# Patient Record
Sex: FEMALE | Race: WHITE | NOT HISPANIC OR LATINO | URBAN - METROPOLITAN AREA
[De-identification: names, ages, dates, MRNs, and addresses within clinical notes are randomized per-mention and may not be internally consistent; named-entity substitution may affect disease eponyms.]

---

## 2018-07-29 RX ORDER — NITROFURANTOIN MACROCRYSTAL 50 MG
1 CAPSULE ORAL
Qty: 0 | Refills: 0 | COMMUNITY
Start: 2018-07-29

## 2018-08-01 ENCOUNTER — INPATIENT (INPATIENT)
Facility: HOSPITAL | Age: 73
LOS: 1 days | Discharge: HOME | End: 2018-08-03
Attending: INTERNAL MEDICINE | Admitting: INTERNAL MEDICINE
Payer: MEDICARE

## 2018-08-01 VITALS — SYSTOLIC BLOOD PRESSURE: 123 MMHG | TEMPERATURE: 98 F | DIASTOLIC BLOOD PRESSURE: 81 MMHG | HEART RATE: 91 BPM

## 2018-08-01 DIAGNOSIS — D72.829 ELEVATED WHITE BLOOD CELL COUNT, UNSPECIFIED: ICD-10-CM

## 2018-08-01 DIAGNOSIS — R10.9 UNSPECIFIED ABDOMINAL PAIN: ICD-10-CM
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
## 2018-08-01 DIAGNOSIS — S00.12XA CONTUSION OF LEFT EYELID AND PERIOCULAR AREA, INITIAL ENCOUNTER: ICD-10-CM

## 2018-08-01 DIAGNOSIS — G30.9 ALZHEIMER'S DISEASE, UNSPECIFIED: ICD-10-CM

## 2018-08-01 DIAGNOSIS — R64 CACHEXIA: ICD-10-CM
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
## 2018-08-01 DIAGNOSIS — F02.81 DEMENTIA IN OTHER DISEASES CLASSIFIED ELSEWHERE, UNSPECIFIED SEVERITY, WITH BEHAVIORAL DISTURBANCE: ICD-10-CM

## 2018-08-01 DIAGNOSIS — F41.9 ANXIETY DISORDER, UNSPECIFIED: ICD-10-CM

## 2018-08-01 DIAGNOSIS — F03.91 UNSPECIFIED DEMENTIA WITH BEHAVIORAL DISTURBANCE: ICD-10-CM

## 2018-08-01 LAB
ALBUMIN SERPL ELPH-MCNC: 3 G/DL — LOW (ref 3.3–5)
ALP SERPL-CCNC: 98 U/L — SIGNIFICANT CHANGE UP (ref 40–120)
ALT FLD-CCNC: 19 U/L — SIGNIFICANT CHANGE UP (ref 12–78)
ANION GAP SERPL CALC-SCNC: 7 MMOL/L — SIGNIFICANT CHANGE UP (ref 5–17)
APPEARANCE UR: ABNORMAL
APTT BLD: 27 SEC — LOW (ref 27.5–37.4)
AST SERPL-CCNC: 32 U/L — SIGNIFICANT CHANGE UP (ref 15–37)
BACTERIA # UR AUTO: ABNORMAL
BASOPHILS # BLD AUTO: 0.05 K/UL — SIGNIFICANT CHANGE UP (ref 0–0.2)
BASOPHILS NFR BLD AUTO: 0.4 % — SIGNIFICANT CHANGE UP (ref 0–2)
BILIRUB SERPL-MCNC: 0.5 MG/DL — SIGNIFICANT CHANGE UP (ref 0.2–1.2)
BILIRUB UR-MCNC: NEGATIVE — SIGNIFICANT CHANGE UP
BUN SERPL-MCNC: 13 MG/DL — SIGNIFICANT CHANGE UP (ref 7–23)
CALCIUM SERPL-MCNC: 8.1 MG/DL — LOW (ref 8.5–10.1)
CHLORIDE SERPL-SCNC: 106 MMOL/L — SIGNIFICANT CHANGE UP (ref 96–108)
CO2 SERPL-SCNC: 28 MMOL/L — SIGNIFICANT CHANGE UP (ref 22–31)
COLOR SPEC: YELLOW — SIGNIFICANT CHANGE UP
CREAT SERPL-MCNC: 0.54 MG/DL — SIGNIFICANT CHANGE UP (ref 0.5–1.3)
DIFF PNL FLD: ABNORMAL
EOSINOPHIL # BLD AUTO: 0.08 K/UL — SIGNIFICANT CHANGE UP (ref 0–0.5)
EOSINOPHIL NFR BLD AUTO: 0.7 % — SIGNIFICANT CHANGE UP (ref 0–6)
EPI CELLS # UR: SIGNIFICANT CHANGE UP
GLUCOSE SERPL-MCNC: 86 MG/DL — SIGNIFICANT CHANGE UP (ref 70–99)
GLUCOSE UR QL: NEGATIVE MG/DL — SIGNIFICANT CHANGE UP
HCT VFR BLD CALC: 36.3 % — SIGNIFICANT CHANGE UP (ref 34.5–45)
HGB BLD-MCNC: 12.1 G/DL — SIGNIFICANT CHANGE UP (ref 11.5–15.5)
IMM GRANULOCYTES NFR BLD AUTO: 0.4 % — SIGNIFICANT CHANGE UP (ref 0–1.5)
INR BLD: 1.05 RATIO — SIGNIFICANT CHANGE UP (ref 0.88–1.16)
KETONES UR-MCNC: ABNORMAL
LACTATE SERPL-SCNC: 1.2 MMOL/L — SIGNIFICANT CHANGE UP (ref 0.7–2)
LEUKOCYTE ESTERASE UR-ACNC: ABNORMAL
LYMPHOCYTES # BLD AUTO: 0.89 K/UL — LOW (ref 1–3.3)
LYMPHOCYTES # BLD AUTO: 7.7 % — LOW (ref 13–44)
MCHC RBC-ENTMCNC: 32.4 PG — SIGNIFICANT CHANGE UP (ref 27–34)
MCHC RBC-ENTMCNC: 33.3 GM/DL — SIGNIFICANT CHANGE UP (ref 32–36)
MCV RBC AUTO: 97.3 FL — SIGNIFICANT CHANGE UP (ref 80–100)
MONOCYTES # BLD AUTO: 1.15 K/UL — HIGH (ref 0–0.9)
MONOCYTES NFR BLD AUTO: 10 % — SIGNIFICANT CHANGE UP (ref 2–14)
NEUTROPHILS # BLD AUTO: 9.31 K/UL — HIGH (ref 1.8–7.4)
NEUTROPHILS NFR BLD AUTO: 80.8 % — HIGH (ref 43–77)
NITRITE UR-MCNC: NEGATIVE — SIGNIFICANT CHANGE UP
NRBC # BLD: 0 /100 WBCS — SIGNIFICANT CHANGE UP (ref 0–0)
PH UR: 8 — SIGNIFICANT CHANGE UP (ref 5–8)
PLATELET # BLD AUTO: 179 K/UL — SIGNIFICANT CHANGE UP (ref 150–400)
POTASSIUM SERPL-MCNC: 4.2 MMOL/L — SIGNIFICANT CHANGE UP (ref 3.5–5.3)
POTASSIUM SERPL-SCNC: 4.2 MMOL/L — SIGNIFICANT CHANGE UP (ref 3.5–5.3)
PROT SERPL-MCNC: 6.6 GM/DL — SIGNIFICANT CHANGE UP (ref 6–8.3)
PROT UR-MCNC: 100 MG/DL
PROTHROM AB SERPL-ACNC: 11.3 SEC — SIGNIFICANT CHANGE UP (ref 9.8–12.7)
RBC # BLD: 3.73 M/UL — LOW (ref 3.8–5.2)
RBC # FLD: 12.7 % — SIGNIFICANT CHANGE UP (ref 10.3–14.5)
RBC CASTS # UR COMP ASSIST: >50 /HPF (ref 0–4)
SODIUM SERPL-SCNC: 141 MMOL/L — SIGNIFICANT CHANGE UP (ref 135–145)
SP GR SPEC: 1.01 — SIGNIFICANT CHANGE UP (ref 1.01–1.02)
UROBILINOGEN FLD QL: NEGATIVE MG/DL — SIGNIFICANT CHANGE UP
WBC # BLD: 11.53 K/UL — HIGH (ref 3.8–10.5)
WBC # FLD AUTO: 11.53 K/UL — HIGH (ref 3.8–10.5)
WBC UR QL: SIGNIFICANT CHANGE UP

## 2018-08-01 PROCEDURE — 73630 X-RAY EXAM OF FOOT: CPT | Mod: 26,RT

## 2018-08-01 PROCEDURE — 76377 3D RENDER W/INTRP POSTPROCES: CPT | Mod: 26

## 2018-08-01 PROCEDURE — 70450 CT HEAD/BRAIN W/O DYE: CPT | Mod: 26

## 2018-08-01 PROCEDURE — 99285 EMERGENCY DEPT VISIT HI MDM: CPT

## 2018-08-01 PROCEDURE — 71045 X-RAY EXAM CHEST 1 VIEW: CPT | Mod: 26

## 2018-08-01 RX ORDER — VANCOMYCIN HCL 1 G
1000 VIAL (EA) INTRAVENOUS ONCE
Qty: 0 | Refills: 0 | Status: COMPLETED | OUTPATIENT
Start: 2018-08-01 | End: 2018-08-01

## 2018-08-01 RX ORDER — HALOPERIDOL DECANOATE 100 MG/ML
3 INJECTION INTRAMUSCULAR ONCE
Qty: 0 | Refills: 0 | Status: COMPLETED | OUTPATIENT
Start: 2018-08-01 | End: 2018-08-01

## 2018-08-01 RX ORDER — SODIUM CHLORIDE 9 MG/ML
2000 INJECTION INTRAMUSCULAR; INTRAVENOUS; SUBCUTANEOUS ONCE
Qty: 0 | Refills: 0 | Status: COMPLETED | OUTPATIENT
Start: 2018-08-01 | End: 2018-08-01

## 2018-08-01 RX ORDER — DIPHENHYDRAMINE HCL 50 MG
25 CAPSULE ORAL ONCE
Qty: 0 | Refills: 0 | Status: COMPLETED | OUTPATIENT
Start: 2018-08-01 | End: 2018-08-01

## 2018-08-01 RX ORDER — CEFTRIAXONE 500 MG/1
1000 INJECTION, POWDER, FOR SOLUTION INTRAMUSCULAR; INTRAVENOUS ONCE
Qty: 0 | Refills: 0 | Status: COMPLETED | OUTPATIENT
Start: 2018-08-01 | End: 2018-08-01

## 2018-08-01 RX ORDER — ACETAMINOPHEN 500 MG
650 TABLET ORAL ONCE
Qty: 0 | Refills: 0 | Status: COMPLETED | OUTPATIENT
Start: 2018-08-01 | End: 2018-08-01

## 2018-08-01 RX ADMIN — CEFTRIAXONE 1000 MILLIGRAM(S): 500 INJECTION, POWDER, FOR SOLUTION INTRAMUSCULAR; INTRAVENOUS at 16:52

## 2018-08-01 RX ADMIN — Medication 25 MILLIGRAM(S): at 18:50

## 2018-08-01 RX ADMIN — SODIUM CHLORIDE 1333.33 MILLILITER(S): 9 INJECTION INTRAMUSCULAR; INTRAVENOUS; SUBCUTANEOUS at 16:41

## 2018-08-01 RX ADMIN — HALOPERIDOL DECANOATE 3 MILLIGRAM(S): 100 INJECTION INTRAMUSCULAR at 18:51

## 2018-08-01 RX ADMIN — Medication 0.5 MILLIGRAM(S): at 18:25

## 2018-08-01 RX ADMIN — Medication 650 MILLIGRAM(S): at 16:52

## 2018-08-01 RX ADMIN — Medication 1 MILLIGRAM(S): at 11:40

## 2018-08-01 RX ADMIN — Medication 250 MILLIGRAM(S): at 20:52

## 2018-08-01 RX ADMIN — Medication 1 MILLIGRAM(S): at 14:22

## 2018-08-01 RX ADMIN — HALOPERIDOL DECANOATE 3 MILLIGRAM(S): 100 INJECTION INTRAMUSCULAR at 11:41

## 2018-08-01 NOTE — ED PROVIDER NOTE - OBJECTIVE STATEMENT
72 y/o female with a PMHx of dementia, anxiety presents to the ED c/o abd pain, bruise on left eye. Hx given by daughters due to pt's mental status. Pt was agitated, swinging, trying to bite staff, and uncooperative with physical exam. Haldol and Ativan were given. Unable to obtain a complete Hx secondary to pt's dementia, medical sedation.

## 2018-08-01 NOTE — ED ADULT NURSE NOTE - NSIMPLEMENTINTERV_GEN_ALL_ED
Implemented All Fall with Harm Risk Interventions:  [place] to call system. Call bell, personal items and telephone within reach. Instruct patient to call for assistance. Room bathroom lighting operational. Non-slip footwear when patient is off stretcher. Physically safe environment: no spills, clutter or unnecessary equipment. Stretcher in lowest position, wheels locked, appropriate side rails in place. Provide visual cue, wrist band, yellow gown, etc. Monitor gait and stability. Monitor for mental status changes and reorient to person, place, and time. Review medications for side effects contributing to fall risk. Reinforce activity limits and safety measures with patient and family. Provide visual clues: red socks.

## 2018-08-01 NOTE — ED PROVIDER NOTE - MUSCULOSKELETAL, MLM
Mild dorsolateral ecchymosis, no swelling. No obvious gross deformity +bruising w/o associated tenderness. Right foot no gross deformity. DP pulse adequate. Trace non pitting edema. B/L legs no focal deformity TTP, swelling

## 2018-08-01 NOTE — ED ADULT TRIAGE NOTE - CHIEF COMPLAINT QUOTE
pt found with bruises under the eye, no witnessed fall. Pt is currently being treated for UTI, pt is very aggressive with staff unable to get vital signs at triage with history of dementia ( baseline)

## 2018-08-01 NOTE — ED PROVIDER NOTE - MEDICAL DECISION MAKING DETAILS
Elderly female +dementia recently on Macrobid for UTI. Hx of occasional agitation, violent outbursts. BIBA from nursing home after bruising left eye noted. c/o +right foot TTP, c/o of abd discomfort. Plan labs including urine, bladder skin, CT head/orbits, IV fluid. Observe reassess Elderly female +dementia recently on Macrobid for UTI. Hx of occasional agitation, violent outbursts. BIBA from nursing home after bruising left eye noted. c/o +right foot TTP, c/o of abd discomfort. Plan labs including urine, bladder scan, CT head/orbits, IV fluid. Observe reassess

## 2018-08-02 LAB
ANION GAP SERPL CALC-SCNC: 7 MMOL/L — SIGNIFICANT CHANGE UP (ref 5–17)
BASOPHILS # BLD AUTO: 0.04 K/UL — SIGNIFICANT CHANGE UP (ref 0–0.2)
BASOPHILS NFR BLD AUTO: 0.6 % — SIGNIFICANT CHANGE UP (ref 0–2)
BUN SERPL-MCNC: 9 MG/DL — SIGNIFICANT CHANGE UP (ref 7–23)
CALCIUM SERPL-MCNC: 8.1 MG/DL — LOW (ref 8.5–10.1)
CHLORIDE SERPL-SCNC: 112 MMOL/L — HIGH (ref 96–108)
CO2 SERPL-SCNC: 26 MMOL/L — SIGNIFICANT CHANGE UP (ref 22–31)
CREAT SERPL-MCNC: 0.47 MG/DL — LOW (ref 0.5–1.3)
CULTURE RESULTS: NO GROWTH — SIGNIFICANT CHANGE UP
EOSINOPHIL # BLD AUTO: 0.15 K/UL — SIGNIFICANT CHANGE UP (ref 0–0.5)
EOSINOPHIL NFR BLD AUTO: 2.3 % — SIGNIFICANT CHANGE UP (ref 0–6)
GLUCOSE SERPL-MCNC: 82 MG/DL — SIGNIFICANT CHANGE UP (ref 70–99)
HCT VFR BLD CALC: 33.6 % — LOW (ref 34.5–45)
HGB BLD-MCNC: 11.1 G/DL — LOW (ref 11.5–15.5)
IMM GRANULOCYTES NFR BLD AUTO: 0.3 % — SIGNIFICANT CHANGE UP (ref 0–1.5)
LYMPHOCYTES # BLD AUTO: 1.08 K/UL — SIGNIFICANT CHANGE UP (ref 1–3.3)
LYMPHOCYTES # BLD AUTO: 16.5 % — SIGNIFICANT CHANGE UP (ref 13–44)
MCHC RBC-ENTMCNC: 32.2 PG — SIGNIFICANT CHANGE UP (ref 27–34)
MCHC RBC-ENTMCNC: 33 GM/DL — SIGNIFICANT CHANGE UP (ref 32–36)
MCV RBC AUTO: 97.4 FL — SIGNIFICANT CHANGE UP (ref 80–100)
MONOCYTES # BLD AUTO: 0.91 K/UL — HIGH (ref 0–0.9)
MONOCYTES NFR BLD AUTO: 13.9 % — SIGNIFICANT CHANGE UP (ref 2–14)
NEUTROPHILS # BLD AUTO: 4.36 K/UL — SIGNIFICANT CHANGE UP (ref 1.8–7.4)
NEUTROPHILS NFR BLD AUTO: 66.4 % — SIGNIFICANT CHANGE UP (ref 43–77)
NRBC # BLD: 0 /100 WBCS — SIGNIFICANT CHANGE UP (ref 0–0)
PLATELET # BLD AUTO: 165 K/UL — SIGNIFICANT CHANGE UP (ref 150–400)
POTASSIUM SERPL-MCNC: 3.6 MMOL/L — SIGNIFICANT CHANGE UP (ref 3.5–5.3)
POTASSIUM SERPL-SCNC: 3.6 MMOL/L — SIGNIFICANT CHANGE UP (ref 3.5–5.3)
RBC # BLD: 3.45 M/UL — LOW (ref 3.8–5.2)
RBC # FLD: 12.9 % — SIGNIFICANT CHANGE UP (ref 10.3–14.5)
SODIUM SERPL-SCNC: 145 MMOL/L — SIGNIFICANT CHANGE UP (ref 135–145)
SPECIMEN SOURCE: SIGNIFICANT CHANGE UP
WBC # BLD: 6.56 K/UL — SIGNIFICANT CHANGE UP (ref 3.8–10.5)
WBC # FLD AUTO: 6.56 K/UL — SIGNIFICANT CHANGE UP (ref 3.8–10.5)

## 2018-08-02 RX ORDER — SENNA PLUS 8.6 MG/1
2 TABLET ORAL AT BEDTIME
Qty: 0 | Refills: 0 | Status: DISCONTINUED | OUTPATIENT
Start: 2018-08-02 | End: 2018-08-03

## 2018-08-02 RX ORDER — CEFTRIAXONE 500 MG/1
1000 INJECTION, POWDER, FOR SOLUTION INTRAMUSCULAR; INTRAVENOUS EVERY 24 HOURS
Qty: 0 | Refills: 0 | Status: DISCONTINUED | OUTPATIENT
Start: 2018-08-02 | End: 2018-08-03

## 2018-08-02 RX ORDER — HEPARIN SODIUM 5000 [USP'U]/ML
5000 INJECTION INTRAVENOUS; SUBCUTANEOUS EVERY 8 HOURS
Qty: 0 | Refills: 0 | Status: DISCONTINUED | OUTPATIENT
Start: 2018-08-02 | End: 2018-08-03

## 2018-08-02 RX ORDER — ALPRAZOLAM 0.25 MG
0.25 TABLET ORAL THREE TIMES A DAY
Qty: 0 | Refills: 0 | Status: DISCONTINUED | OUTPATIENT
Start: 2018-08-02 | End: 2018-08-03

## 2018-08-02 RX ORDER — DORZOLAMIDE HYDROCHLORIDE TIMOLOL MALEATE 20; 5 MG/ML; MG/ML
1 SOLUTION/ DROPS OPHTHALMIC
Qty: 0 | Refills: 0 | Status: DISCONTINUED | OUTPATIENT
Start: 2018-08-02 | End: 2018-08-03

## 2018-08-02 RX ORDER — QUETIAPINE FUMARATE 200 MG/1
25 TABLET, FILM COATED ORAL DAILY
Qty: 0 | Refills: 0 | Status: DISCONTINUED | OUTPATIENT
Start: 2018-08-02 | End: 2018-08-03

## 2018-08-02 RX ORDER — DOCUSATE SODIUM 100 MG
100 CAPSULE ORAL DAILY
Qty: 0 | Refills: 0 | Status: DISCONTINUED | OUTPATIENT
Start: 2018-08-02 | End: 2018-08-03

## 2018-08-02 RX ORDER — PIPERACILLIN AND TAZOBACTAM 4; .5 G/20ML; G/20ML
3.38 INJECTION, POWDER, LYOPHILIZED, FOR SOLUTION INTRAVENOUS EVERY 8 HOURS
Qty: 0 | Refills: 0 | Status: DISCONTINUED | OUTPATIENT
Start: 2018-08-02 | End: 2018-08-02

## 2018-08-02 RX ORDER — ACETAMINOPHEN 500 MG
650 TABLET ORAL EVERY 8 HOURS
Qty: 0 | Refills: 0 | Status: DISCONTINUED | OUTPATIENT
Start: 2018-08-02 | End: 2018-08-03

## 2018-08-02 RX ORDER — LANOLIN ALCOHOL/MO/W.PET/CERES
3 CREAM (GRAM) TOPICAL AT BEDTIME
Qty: 0 | Refills: 0 | Status: DISCONTINUED | OUTPATIENT
Start: 2018-08-02 | End: 2018-08-03

## 2018-08-02 RX ORDER — QUETIAPINE FUMARATE 200 MG/1
100 TABLET, FILM COATED ORAL AT BEDTIME
Qty: 0 | Refills: 0 | Status: DISCONTINUED | OUTPATIENT
Start: 2018-08-02 | End: 2018-08-03

## 2018-08-02 RX ORDER — DIVALPROEX SODIUM 500 MG/1
125 TABLET, DELAYED RELEASE ORAL
Qty: 0 | Refills: 0 | Status: DISCONTINUED | OUTPATIENT
Start: 2018-08-02 | End: 2018-08-03

## 2018-08-02 RX ADMIN — Medication 0.25 MILLIGRAM(S): at 16:54

## 2018-08-02 RX ADMIN — QUETIAPINE FUMARATE 25 MILLIGRAM(S): 200 TABLET, FILM COATED ORAL at 12:33

## 2018-08-02 RX ADMIN — DORZOLAMIDE HYDROCHLORIDE TIMOLOL MALEATE 1 DROP(S): 20; 5 SOLUTION/ DROPS OPHTHALMIC at 12:33

## 2018-08-02 RX ADMIN — DIVALPROEX SODIUM 125 MILLIGRAM(S): 500 TABLET, DELAYED RELEASE ORAL at 18:54

## 2018-08-02 RX ADMIN — HEPARIN SODIUM 5000 UNIT(S): 5000 INJECTION INTRAVENOUS; SUBCUTANEOUS at 06:58

## 2018-08-02 RX ADMIN — DORZOLAMIDE HYDROCHLORIDE TIMOLOL MALEATE 1 DROP(S): 20; 5 SOLUTION/ DROPS OPHTHALMIC at 18:56

## 2018-08-02 RX ADMIN — CEFTRIAXONE 1000 MILLIGRAM(S): 500 INJECTION, POWDER, FOR SOLUTION INTRAMUSCULAR; INTRAVENOUS at 16:54

## 2018-08-02 RX ADMIN — Medication 10 MILLIGRAM(S): at 12:35

## 2018-08-02 RX ADMIN — SENNA PLUS 2 TABLET(S): 8.6 TABLET ORAL at 21:23

## 2018-08-02 RX ADMIN — Medication 3 MILLIGRAM(S): at 21:23

## 2018-08-02 RX ADMIN — DIVALPROEX SODIUM 125 MILLIGRAM(S): 500 TABLET, DELAYED RELEASE ORAL at 12:33

## 2018-08-02 RX ADMIN — QUETIAPINE FUMARATE 100 MILLIGRAM(S): 200 TABLET, FILM COATED ORAL at 21:23

## 2018-08-02 RX ADMIN — HEPARIN SODIUM 5000 UNIT(S): 5000 INJECTION INTRAVENOUS; SUBCUTANEOUS at 21:32

## 2018-08-02 NOTE — PATIENT PROFILE ADULT. - TEACHING/LEARNING LEARNING PREFERENCES
computer/internet/skill demonstration/written material/audio/pictorial/video/group instruction/individual instruction/verbal instruction

## 2018-08-02 NOTE — H&P ADULT - HISTORY OF PRESENT ILLNESS
72 y/o female with a PMHx of dementia, anxiety presents to the ED c/o abd pain, bruise on left eye. Hx given by daughters due to pt's mental status. Pt was agitated, swinging, trying to bite staff, and uncooperative with physical exam. Haldol and Ativan were given. Unable to obtain a complete Hx secondary to pt's dementia, medical sedation.     Family Hx  -unable to verify the detail FH

## 2018-08-02 NOTE — H&P ADULT - ASSESSMENT
74 yo female presented with abdominal pain.    A/P:    1.  UTI  Pneumonia: HCAP  -started on IV antibiotics  -follow cultures  -follow ID consult  -follow clinically    2.  Dementia  Anxiety  -continue home medications    3.  Heparin for DVT ppx 74 yo female presented with abdominal pain.    A/P:    1.  UTI  Pneumonia: HCAP  -started on IV antibiotics  -follow cultures  -follow ID consult  -follow clinically    2.  Dementia  Anxiety  -continue home medications  -keep on 1:1 constant observation    3.  Heparin for DVT ppx

## 2018-08-02 NOTE — ED ADULT NURSE REASSESSMENT NOTE - NS ED NURSE REASSESS COMMENT FT1
Attempt to straight cath patient for urine sample. Urine amount too small to send to lab. Patient's pullup was saturated. Will attempt later.
[facility] [phone]
Pt remains as previously assessed, MD Boyd at bedside and aware of patient's status, pt remains asleep and drowsy but responsive to physical touch, 1:1 observation maintained, comfort and safety measures maintained, VS stable, pt transferred off of ER to inpatient unit. Report given to RN there.
Received patient from SHIRA Tony. Pt asleep, drowsy but arousable to touch, pt compliant with nursing care, cleaned, changed and repositioned, VS stable, comfort and safety measures maintained, Pt in front of nurses station, will continue to monitor.

## 2018-08-02 NOTE — H&P ADULT - NSHPPHYSICALEXAM_GEN_ALL_CORE
Vital Signs Last 24 Hrs  T(C): 37.6 (02 Aug 2018 02:22), Max: 38.7 (01 Aug 2018 15:50)  T(F): 99.7 (02 Aug 2018 02:22), Max: 101.6 (01 Aug 2018 15:50)  HR: 89 (01 Aug 2018 20:53) (89 - 91)  BP: 122/53 (01 Aug 2018 20:53) (122/53 - 123/81)  RR: 19 (01 Aug 2018 20:53) (19 - 19)  SpO2: 98% (01 Aug 2018 20:53) (98% - 98%)

## 2018-08-03 ENCOUNTER — TRANSCRIPTION ENCOUNTER (OUTPATIENT)
Age: 73
End: 2018-08-03

## 2018-08-03 VITALS
SYSTOLIC BLOOD PRESSURE: 119 MMHG | TEMPERATURE: 98 F | RESPIRATION RATE: 18 BRPM | DIASTOLIC BLOOD PRESSURE: 51 MMHG | OXYGEN SATURATION: 99 % | HEART RATE: 75 BPM

## 2018-08-03 PROCEDURE — 73562 X-RAY EXAM OF KNEE 3: CPT | Mod: 26,RT

## 2018-08-03 PROCEDURE — 99223 1ST HOSP IP/OBS HIGH 75: CPT

## 2018-08-03 RX ADMIN — HEPARIN SODIUM 5000 UNIT(S): 5000 INJECTION INTRAVENOUS; SUBCUTANEOUS at 14:55

## 2018-08-03 RX ADMIN — Medication 0.25 MILLIGRAM(S): at 14:55

## 2018-08-03 RX ADMIN — DIVALPROEX SODIUM 125 MILLIGRAM(S): 500 TABLET, DELAYED RELEASE ORAL at 05:53

## 2018-08-03 RX ADMIN — Medication 650 MILLIGRAM(S): at 10:40

## 2018-08-03 RX ADMIN — Medication 0.25 MILLIGRAM(S): at 05:53

## 2018-08-03 RX ADMIN — Medication 100 MILLIGRAM(S): at 11:56

## 2018-08-03 RX ADMIN — QUETIAPINE FUMARATE 25 MILLIGRAM(S): 200 TABLET, FILM COATED ORAL at 11:57

## 2018-08-03 RX ADMIN — Medication 10 MILLIGRAM(S): at 11:56

## 2018-08-03 RX ADMIN — DORZOLAMIDE HYDROCHLORIDE TIMOLOL MALEATE 1 DROP(S): 20; 5 SOLUTION/ DROPS OPHTHALMIC at 05:54

## 2018-08-03 RX ADMIN — HEPARIN SODIUM 5000 UNIT(S): 5000 INJECTION INTRAVENOUS; SUBCUTANEOUS at 05:53

## 2018-08-03 NOTE — DISCHARGE NOTE ADULT - HOSPITAL COURSE
74 y/o female with a PMHx of dementia, anxiety presents to the ED c/o abd pain, bruise on left eye. Hx given by daughters due to pt's mental status. Pt was agitated, swinging, trying to bite staff, and uncooperative with physical exam. Haldol and Ativan were given. Unable to obtain a complete Hx secondary to pt's dementia, medical sedation.     Hospital course: Patient was admitted to medical floor. She had CT head on admission: no acute changes. She was started on IV rocephin for suspected UTI. Seen by Dr Henao. Seen by Dr Ness.   Blood cultres and urine cultures are negative. IV rocephin was discontinued. She was put on CO for safety. Discussed with  and daughter yesterday and today regarding management and d/c plan. Patient is followed by Dr Wiggins and Dr Frank. Medically stable for discharge.     Vital Signs Last 24 Hrs  T(C): 36.8 (03 Aug 2018 11:26), Max: 37.3 (03 Aug 2018 06:01)  T(F): 98.2 (03 Aug 2018 11:26), Max: 99.2 (03 Aug 2018 06:01)  HR: 75 (03 Aug 2018 11:26) (68 - 93)  BP: 119/51 (03 Aug 2018 11:26) (119/50 - 127/72)  BP(mean): --  RR: 18 (03 Aug 2018 11:26) (18 - 18)  SpO2: 99% (03 Aug 2018 11:26) (98% - 99%)        Physical exam:     HEENT: AT, normocephalic,   Neck: supple, no JVD  Heart: S1, S2 regular.  Lungs: clear, no rales.  Abdomen: soft, nontender, BS present  Neuro: difficult to assess due to advance dementia      Assessment and plan:    1. Worsening dementia-  CT head: no acute changes.  Blood cultures and urine cultures: no growth  No UTI, no pneumonia  Dr Ness consult appreciated.  On CO for safety.  Outpatient psych and neuro follow up and adjust meds.  Continue outpatient meds.      Back to assisted living.  Spent more than 30 minutes to prepare the discharge.  PMD was notified regarding d/c 74 y/o female with a PMHx of dementia, anxiety presents to the ED c/o abd pain, bruise on left eye. Hx given by daughters due to pt's mental status. Pt was agitated, swinging, trying to bite staff, and uncooperative with physical exam. Haldol and Ativan were given. Unable to obtain a complete Hx secondary to pt's dementia, medical sedation.     Hospital course: Patient was admitted to medical floor. She had CT head on admission: no acute changes. She was started on IV rocephin for suspected UTI. Seen by Dr Henao. Seen by Dr Ness.   Blood cultres and urine cultures are negative. IV rocephin was discontinued. She was put on CO for safety. Discussed with  and daughter yesterday and today regarding management and d/c plan. Patient is followed by Dr Wiggins and Dr Frank. Medically stable for discharge.     Vital Signs Last 24 Hrs  T(C): 36.8 (03 Aug 2018 11:26), Max: 37.3 (03 Aug 2018 06:01)  T(F): 98.2 (03 Aug 2018 11:26), Max: 99.2 (03 Aug 2018 06:01)  HR: 75 (03 Aug 2018 11:26) (68 - 93)  BP: 119/51 (03 Aug 2018 11:26) (119/50 - 127/72)  BP(mean): --  RR: 18 (03 Aug 2018 11:26) (18 - 18)  SpO2: 99% (03 Aug 2018 11:26) (98% - 99%)        Physical exam:     HEENT: AT, normocephalic,   Neck: supple, no JVD  Heart: S1, S2 regular.  Lungs: clear, no rales.  Abdomen: soft, nontender, BS present  Neuro: difficult to assess due to advance dementia      Assessment and plan:    1. Worsening Alzheimer's dementia-  CT head: no acute changes.  Blood cultures and urine cultures: no growth  No UTI, no pneumonia  Dr Ness consult appreciated.  On CO for safety.  Outpatient psych and neuro follow up and adjust meds.  Continue outpatient meds.      Back to assisted living.  Spent more than 30 minutes to prepare the discharge.  PMD was notified regarding d/c

## 2018-08-03 NOTE — DISCHARGE NOTE ADULT - CARE PLAN
Principal Discharge DX:	Anxiety  Goal:	prevent further admission  Assessment and plan of treatment:	Follow up with Dr Wiggins

## 2018-08-03 NOTE — DISCHARGE NOTE ADULT - PATIENT PORTAL LINK FT
You can access the [facility], offered by [facility], by registering with the following website: [email or web address]

## 2018-08-03 NOTE — DISCHARGE NOTE ADULT - CARE PROVIDERS DIRECT ADDRESSES
,[email or web address] sale to [facility],DirectAddress_Unknown

## 2018-08-03 NOTE — CONSULT NOTE ADULT - ASSESSMENT
74 y/o female with a PMHx of dementia, anxiety presents to the ED c/o abd pain, bruise on left eye. As per ED physicians note; pt was agitated, swinging, trying to bite staff, and uncooperative with physical exam.    As per pts husbnad and son, pt has had evolving cognitive and behavioral changes since past 2 years, she needs assistance, can barely take care of her ADL's, she has been seeing a psychiatrist, is on Seroquel, Depakote and Paroxetine.     Neuro exam is limited, pt is tearful, looks sad, barely talks.     # Dementia with behavioral disorder.    - I had a detailed d/w pt's  and son, I have told them that she needs to be evaluated as OP for cognitive   decline; in the current setting assessing mnetal status is difficult.  - Pt seems depressed; she needs joycelyn-psyche input to adjust mood stabilizers.  - Obtain B12, FOL, TSH    # Leukocytosis/probable UTI    - Management as per ID    d/w PT's , her son and with Dr. Crawford    Time spent: 60 minutes
74 y/o female with a PMHx of dementia, anxiety presents to the ED c/o abd pain, bruise on left eye. Hx given by daughters due to pt's mental status. Pt was agitated, swinging, trying to bite staff, and uncooperative with physical exam. Haldol and Ativan were given. Unable to obtain a complete Hx secondary to pt's dementia, medical sedation. Here, noted with 11.5, UA positive, xray no obvious infiltrate or PNA was given IV zosyn d/t concern for infection.     1. leukocytosis/pyuria/probable UTI/ams/alzheimers dementia  - dc zosyn to rocephin [id number]   - f/u urine cx/blood cx  - xray reviewed  - monitor temps  - tolerating abx well so far; no side effects noted  - reason for abx use and side effects reviewed with patient  - supportive care  - fu cbc    2. other issues - care per medicine

## 2018-08-03 NOTE — DISCHARGE NOTE ADULT - MEDICATION SUMMARY - MEDICATIONS TO TAKE
I will START or STAY ON the medications listed below when I get home from the hospital:    Depakote Sprinkles 125 mg oral delayed release capsule  -- 1 cap(s) by mouth 2 times a day  -- Indication: For dementia/depression    PARoxetine 10 mg oral tablet  -- 1 tab(s) by mouth once a day  -- Indication: For depression    QUEtiapine 25 mg oral tablet  -- 1 tab(s) by mouth once a day  -- Indication: For depression    QUEtiapine 100 mg oral tablet  -- 1 tab(s) by mouth once a day (at bedtime)  -- Indication: For depression    Xanax 0.25 mg oral tablet  -- 1 tab(s) by mouth 3 times a day  -- Indication: For anxiety    Colace 100 mg oral capsule  -- 1 cap(s) by mouth once a day  -- Indication: For constipation    senna 8.6 mg oral tablet  -- 2 tab(s) by mouth once a day (at bedtime)  -- Indication: For constipation    melatonin 3 mg oral tablet  -- 1 tab(s) by mouth once (at bedtime)  -- Indication: For sleep    Cosopt PF 2%-0.5% ophthalmic solution  -- 1 drop(s) in the left eye 2 times a day  -- Indication: For eye drop

## 2018-08-03 NOTE — DISCHARGE NOTE ADULT - CARE PROVIDER_API CALL
Lottie Frank), Neurology  [address] [place] [address]  Phone: [phone]  Fax: [phone]    Lazaro Emerson), Medicine  [address] [place] [address]  Phone: [phone]  Fax: [phone]

## 2018-08-06 ENCOUNTER — EMERGENCY (EMERGENCY)
Facility: HOSPITAL | Age: 73
LOS: 0 days | Discharge: HOME | End: 2018-08-06
Attending: EMERGENCY MEDICINE | Admitting: EMERGENCY MEDICINE
Payer: MEDICARE

## 2018-08-06 VITALS
DIASTOLIC BLOOD PRESSURE: 74 MMHG | HEART RATE: 63 BPM | SYSTOLIC BLOOD PRESSURE: 147 MMHG | TEMPERATURE: 98 F | RESPIRATION RATE: 18 BRPM | WEIGHT: 100.09 LBS | OXYGEN SATURATION: 100 %

## 2018-08-06 DIAGNOSIS — Z90.89 ACQUIRED ABSENCE OF OTHER ORGANS: ICD-10-CM
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
## 2018-08-06 DIAGNOSIS — F03.90 UNSPECIFIED DEMENTIA WITHOUT BEHAVIORAL DISTURBANCE: ICD-10-CM

## 2018-08-06 DIAGNOSIS — S09.90XA UNSPECIFIED INJURY OF HEAD, INITIAL ENCOUNTER: ICD-10-CM

## 2018-08-06 DIAGNOSIS — M25.559 PAIN IN UNSPECIFIED HIP: ICD-10-CM
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
## 2018-08-06 DIAGNOSIS — S00.91XA ABRASION OF UNSPECIFIED PART OF HEAD, INITIAL ENCOUNTER: ICD-10-CM

## 2018-08-06 DIAGNOSIS — M50.30 OTHER CERVICAL DISC DEGENERATION, UNSPECIFIED CERVICAL REGION: ICD-10-CM

## 2018-08-06 DIAGNOSIS — Z87.19 PERSONAL HISTORY OF OTHER DISEASES OF THE DIGESTIVE SYSTEM: ICD-10-CM

## 2018-08-06 DIAGNOSIS — F41.9 ANXIETY DISORDER, UNSPECIFIED: ICD-10-CM

## 2018-08-06 LAB
CULTURE RESULTS: SIGNIFICANT CHANGE UP
SPECIMEN SOURCE: SIGNIFICANT CHANGE UP

## 2018-08-06 PROCEDURE — 72192 CT PELVIS W/O DYE: CPT | Mod: 26

## 2018-08-06 PROCEDURE — 70450 CT HEAD/BRAIN W/O DYE: CPT | Mod: 26

## 2018-08-06 PROCEDURE — 72125 CT NECK SPINE W/O DYE: CPT | Mod: 26

## 2018-08-06 PROCEDURE — 99285 EMERGENCY DEPT VISIT HI MDM: CPT

## 2018-08-06 PROCEDURE — 76377 3D RENDER W/INTRP POSTPROCES: CPT | Mod: 26

## 2018-08-06 PROCEDURE — 93010 ELECTROCARDIOGRAM REPORT: CPT

## 2018-08-06 NOTE — ED PROVIDER NOTE - OBJECTIVE STATEMENT
74 y/o female with a PMHx of anxiety presents to the ED s/p fall. Pt was home, aid walked in and found pt on floor. Aid notes abrasion on pt's head. +head injury. Pt is poor historian. PCP: Dr. Booth. 74 y/o female with a PMHx of anxiety, dementia presents to the ED s/p fall. Pt was home, aid walked in and found pt on floor. Aid notes abrasion on pt's head. +head injury. Pt is poor historian. PCP: Dr. Booth.

## 2018-08-06 NOTE — ED ADULT NURSE NOTE - OBJECTIVE STATEMENT
Pt BIBA from [facility] dementia unit s/p fall from bed on to carpet floor. Per HHA, pt was found on floor next to bed when she arrived this morning. Pt has minor laceration to right forehead, no active bleeding. Per  at bedside pt is acting at baseline. Pt has hx of dementia. Pt appears in no acute distress.

## 2018-08-06 NOTE — ED ADULT NURSE REASSESSMENT NOTE - NS ED NURSE REASSESS COMMENT FT1
Pt has minor laceration to right forehead, no bleeding. Pt's lac cleaned, Bacitracin and band aid applied. Pt's daughter,  and aide at bedside.

## 2018-08-06 NOTE — ED ADULT TRIAGE NOTE - CHIEF COMPLAINT QUOTE
Unwitnessed fall at NH, pt without complaints on arrival.  0.5-cm lac to R temple, no bleeding.  No anticoagulants.  Baseline dementia.

## 2018-08-06 NOTE — ED PROVIDER NOTE - SKIN, MLM
Skin normal color for race, warm, dry and intact. No evidence of rash. Hematoma abrasion right forehead area

## 2018-08-06 NOTE — ED ADULT NURSE NOTE - NSIMPLEMENTINTERV_GEN_ALL_ED
Implemented All Fall with Harm Risk Interventions:  [place] to call system. Call bell, personal items and telephone within reach. Instruct patient to call for assistance. Room bathroom lighting operational. Non-slip footwear when patient is off stretcher. Physically safe environment: no spills, clutter or unnecessary equipment. Stretcher in lowest position, wheels locked, appropriate side rails in place. Provide visual cue, wrist band, yellow gown, etc. Monitor gait and stability. Monitor for mental status changes and reorient to person, place, and time. Review medications for side effects contributing to fall risk. Reinforce activity limits and safety measures with patient and family. Provide visual clues: red socks.

## 2018-08-31 RX ORDER — TOBRAMYCIN 0.3 %
1 DROPS OPHTHALMIC (EYE)
Qty: 0 | Refills: 0 | COMMUNITY
Start: 2018-08-31 | End: 2018-09-06

## 2018-08-31 RX ORDER — CEFUROXIME AXETIL 250 MG
1 TABLET ORAL
Qty: 0 | Refills: 0 | COMMUNITY
Start: 2018-08-31 | End: 2018-09-10

## 2018-09-18 RX ORDER — GUAIFENESIN/DEXTROMETHORPHAN 600MG-30MG
5 TABLET, EXTENDED RELEASE 12 HR ORAL
Qty: 0 | Refills: 0 | COMMUNITY
Start: 2018-09-18 | End: 2018-09-24

## 2018-09-20 RX ORDER — ACETAMINOPHEN 500 MG
2 TABLET ORAL
Qty: 0 | Refills: 0 | COMMUNITY
Start: 2018-09-20 | End: 2018-09-22

## 2018-09-21 ENCOUNTER — INPATIENT (INPATIENT)
Facility: HOSPITAL | Age: 73
LOS: 3 days | Discharge: HOSPICE | End: 2018-09-25
Attending: INTERNAL MEDICINE | Admitting: INTERNAL MEDICINE
Payer: MEDICARE

## 2018-09-21 VITALS
SYSTOLIC BLOOD PRESSURE: 122 MMHG | TEMPERATURE: 103 F | HEART RATE: 90 BPM | WEIGHT: 130.07 LBS | OXYGEN SATURATION: 95 % | DIASTOLIC BLOOD PRESSURE: 95 MMHG | HEIGHT: 65 IN | RESPIRATION RATE: 18 BRPM

## 2018-09-21 DIAGNOSIS — A41.9 SEPSIS, UNSPECIFIED ORGANISM: ICD-10-CM

## 2018-09-21 DIAGNOSIS — B34.1 ENTEROVIRUS INFECTION, UNSPECIFIED: ICD-10-CM

## 2018-09-21 DIAGNOSIS — G20 PARKINSON'S DISEASE: ICD-10-CM

## 2018-09-21 DIAGNOSIS — F03.90 UNSPECIFIED DEMENTIA WITHOUT BEHAVIORAL DISTURBANCE: ICD-10-CM

## 2018-09-21 DIAGNOSIS — J15.9 UNSPECIFIED BACTERIAL PNEUMONIA: ICD-10-CM

## 2018-09-21 DIAGNOSIS — F41.9 ANXIETY DISORDER, UNSPECIFIED: ICD-10-CM

## 2018-09-21 LAB
ALBUMIN SERPL ELPH-MCNC: 2.4 G/DL — LOW (ref 3.3–5)
ALP SERPL-CCNC: 69 U/L — SIGNIFICANT CHANGE UP (ref 40–120)
ALT FLD-CCNC: 15 U/L — SIGNIFICANT CHANGE UP (ref 12–78)
ANION GAP SERPL CALC-SCNC: 8 MMOL/L — SIGNIFICANT CHANGE UP (ref 5–17)
APPEARANCE UR: CLEAR — SIGNIFICANT CHANGE UP
APTT BLD: 27.4 SEC — LOW (ref 27.5–37.4)
AST SERPL-CCNC: 26 U/L — SIGNIFICANT CHANGE UP (ref 15–37)
BASOPHILS # BLD AUTO: 0.04 K/UL — SIGNIFICANT CHANGE UP (ref 0–0.2)
BASOPHILS NFR BLD AUTO: 0.4 % — SIGNIFICANT CHANGE UP (ref 0–2)
BILIRUB SERPL-MCNC: 0.6 MG/DL — SIGNIFICANT CHANGE UP (ref 0.2–1.2)
BILIRUB UR-MCNC: NEGATIVE — SIGNIFICANT CHANGE UP
BUN SERPL-MCNC: 12 MG/DL — SIGNIFICANT CHANGE UP (ref 7–23)
CALCIUM SERPL-MCNC: 8.4 MG/DL — LOW (ref 8.5–10.1)
CHLORIDE SERPL-SCNC: 104 MMOL/L — SIGNIFICANT CHANGE UP (ref 96–108)
CO2 SERPL-SCNC: 29 MMOL/L — SIGNIFICANT CHANGE UP (ref 22–31)
COLOR SPEC: YELLOW — SIGNIFICANT CHANGE UP
CREAT SERPL-MCNC: 0.53 MG/DL — SIGNIFICANT CHANGE UP (ref 0.5–1.3)
DIFF PNL FLD: ABNORMAL
EOSINOPHIL # BLD AUTO: 0.01 K/UL — SIGNIFICANT CHANGE UP (ref 0–0.5)
EOSINOPHIL NFR BLD AUTO: 0.1 % — SIGNIFICANT CHANGE UP (ref 0–6)
GLUCOSE SERPL-MCNC: 88 MG/DL — SIGNIFICANT CHANGE UP (ref 70–99)
GLUCOSE UR QL: NEGATIVE MG/DL — SIGNIFICANT CHANGE UP
HCT VFR BLD CALC: 34.7 % — SIGNIFICANT CHANGE UP (ref 34.5–45)
HGB BLD-MCNC: 11.3 G/DL — LOW (ref 11.5–15.5)
IMM GRANULOCYTES NFR BLD AUTO: 0.6 % — SIGNIFICANT CHANGE UP (ref 0–1.5)
INR BLD: 1.25 RATIO — HIGH (ref 0.88–1.16)
KETONES UR-MCNC: ABNORMAL
LACTATE SERPL-SCNC: 1.4 MMOL/L — SIGNIFICANT CHANGE UP (ref 0.7–2)
LEUKOCYTE ESTERASE UR-ACNC: ABNORMAL
LYMPHOCYTES # BLD AUTO: 0.37 K/UL — LOW (ref 1–3.3)
LYMPHOCYTES # BLD AUTO: 3.5 % — LOW (ref 13–44)
MCHC RBC-ENTMCNC: 32.2 PG — SIGNIFICANT CHANGE UP (ref 27–34)
MCHC RBC-ENTMCNC: 32.6 GM/DL — SIGNIFICANT CHANGE UP (ref 32–36)
MCV RBC AUTO: 98.9 FL — SIGNIFICANT CHANGE UP (ref 80–100)
MONOCYTES # BLD AUTO: 0.82 K/UL — SIGNIFICANT CHANGE UP (ref 0–0.9)
MONOCYTES NFR BLD AUTO: 7.8 % — SIGNIFICANT CHANGE UP (ref 2–14)
NEUTROPHILS # BLD AUTO: 9.19 K/UL — HIGH (ref 1.8–7.4)
NEUTROPHILS NFR BLD AUTO: 87.6 % — HIGH (ref 43–77)
NITRITE UR-MCNC: NEGATIVE — SIGNIFICANT CHANGE UP
NRBC # BLD: 0 /100 WBCS — SIGNIFICANT CHANGE UP (ref 0–0)
PH UR: 8 — SIGNIFICANT CHANGE UP (ref 5–8)
PLATELET # BLD AUTO: 201 K/UL — SIGNIFICANT CHANGE UP (ref 150–400)
POTASSIUM SERPL-MCNC: 4 MMOL/L — SIGNIFICANT CHANGE UP (ref 3.5–5.3)
POTASSIUM SERPL-SCNC: 4 MMOL/L — SIGNIFICANT CHANGE UP (ref 3.5–5.3)
PROT SERPL-MCNC: 7.2 GM/DL — SIGNIFICANT CHANGE UP (ref 6–8.3)
PROT UR-MCNC: 15 MG/DL
PROTHROM AB SERPL-ACNC: 13.6 SEC — HIGH (ref 9.8–12.7)
RAPID RVP RESULT: DETECTED
RBC # BLD: 3.51 M/UL — LOW (ref 3.8–5.2)
RBC # FLD: 12.3 % — SIGNIFICANT CHANGE UP (ref 10.3–14.5)
RBC CASTS # UR COMP ASSIST: SIGNIFICANT CHANGE UP /HPF (ref 0–4)
RV+EV RNA SPEC QL NAA+PROBE: DETECTED
SODIUM SERPL-SCNC: 141 MMOL/L — SIGNIFICANT CHANGE UP (ref 135–145)
SP GR SPEC: 1.01 — SIGNIFICANT CHANGE UP (ref 1.01–1.02)
UROBILINOGEN FLD QL: 1 MG/DL
WBC # BLD: 10.49 K/UL — SIGNIFICANT CHANGE UP (ref 3.8–10.5)
WBC # FLD AUTO: 10.49 K/UL — SIGNIFICANT CHANGE UP (ref 3.8–10.5)
WBC UR QL: SIGNIFICANT CHANGE UP

## 2018-09-21 PROCEDURE — 93010 ELECTROCARDIOGRAM REPORT: CPT

## 2018-09-21 PROCEDURE — 74177 CT ABD & PELVIS W/CONTRAST: CPT | Mod: 26

## 2018-09-21 PROCEDURE — 90792 PSYCH DIAG EVAL W/MED SRVCS: CPT

## 2018-09-21 PROCEDURE — 99291 CRITICAL CARE FIRST HOUR: CPT

## 2018-09-21 PROCEDURE — 71045 X-RAY EXAM CHEST 1 VIEW: CPT | Mod: 26

## 2018-09-21 RX ORDER — SODIUM CHLORIDE 9 MG/ML
1750 INJECTION INTRAMUSCULAR; INTRAVENOUS; SUBCUTANEOUS ONCE
Qty: 0 | Refills: 0 | Status: COMPLETED | OUTPATIENT
Start: 2018-09-21 | End: 2018-09-21

## 2018-09-21 RX ORDER — DOCUSATE SODIUM 100 MG
100 CAPSULE ORAL DAILY
Qty: 0 | Refills: 0 | Status: DISCONTINUED | OUTPATIENT
Start: 2018-09-21 | End: 2018-09-21

## 2018-09-21 RX ORDER — SENNA PLUS 8.6 MG/1
2 TABLET ORAL
Qty: 0 | Refills: 0 | COMMUNITY

## 2018-09-21 RX ORDER — VANCOMYCIN HCL 1 G
1000 VIAL (EA) INTRAVENOUS EVERY 12 HOURS
Qty: 0 | Refills: 0 | Status: DISCONTINUED | OUTPATIENT
Start: 2018-09-21 | End: 2018-09-22

## 2018-09-21 RX ORDER — QUETIAPINE FUMARATE 200 MG/1
1 TABLET, FILM COATED ORAL
Qty: 0 | Refills: 0 | COMMUNITY

## 2018-09-21 RX ORDER — DIVALPROEX SODIUM 500 MG/1
1 TABLET, DELAYED RELEASE ORAL
Qty: 0 | Refills: 0 | COMMUNITY

## 2018-09-21 RX ORDER — LANOLIN ALCOHOL/MO/W.PET/CERES
3 CREAM (GRAM) TOPICAL AT BEDTIME
Qty: 0 | Refills: 0 | Status: DISCONTINUED | OUTPATIENT
Start: 2018-09-21 | End: 2018-09-25

## 2018-09-21 RX ORDER — DIVALPROEX SODIUM 500 MG/1
2 TABLET, DELAYED RELEASE ORAL
Qty: 0 | Refills: 0 | COMMUNITY

## 2018-09-21 RX ORDER — ALPRAZOLAM 0.25 MG
0.25 TABLET ORAL THREE TIMES A DAY
Qty: 0 | Refills: 0 | Status: DISCONTINUED | OUTPATIENT
Start: 2018-09-21 | End: 2018-09-25

## 2018-09-21 RX ORDER — ACETAMINOPHEN 500 MG
975 TABLET ORAL ONCE
Qty: 0 | Refills: 0 | Status: COMPLETED | OUTPATIENT
Start: 2018-09-21 | End: 2018-09-21

## 2018-09-21 RX ORDER — PIPERACILLIN AND TAZOBACTAM 4; .5 G/20ML; G/20ML
3.38 INJECTION, POWDER, LYOPHILIZED, FOR SOLUTION INTRAVENOUS ONCE
Qty: 0 | Refills: 0 | Status: COMPLETED | OUTPATIENT
Start: 2018-09-21 | End: 2018-09-21

## 2018-09-21 RX ORDER — DORZOLAMIDE HYDROCHLORIDE TIMOLOL MALEATE 20; 5 MG/ML; MG/ML
1 SOLUTION/ DROPS OPHTHALMIC
Qty: 0 | Refills: 0 | Status: DISCONTINUED | OUTPATIENT
Start: 2018-09-21 | End: 2018-09-25

## 2018-09-21 RX ORDER — QUETIAPINE FUMARATE 200 MG/1
100 TABLET, FILM COATED ORAL AT BEDTIME
Qty: 0 | Refills: 0 | Status: DISCONTINUED | OUTPATIENT
Start: 2018-09-21 | End: 2018-09-25

## 2018-09-21 RX ORDER — ALPRAZOLAM 0.25 MG
1 TABLET ORAL
Qty: 0 | Refills: 0 | COMMUNITY

## 2018-09-21 RX ORDER — DORZOLAMIDE HYDROCHLORIDE TIMOLOL MALEATE 20; 5 MG/ML; MG/ML
1 SOLUTION/ DROPS OPHTHALMIC
Qty: 0 | Refills: 0 | COMMUNITY

## 2018-09-21 RX ORDER — LANOLIN ALCOHOL/MO/W.PET/CERES
1 CREAM (GRAM) TOPICAL
Qty: 0 | Refills: 0 | COMMUNITY

## 2018-09-21 RX ORDER — QUETIAPINE FUMARATE 200 MG/1
25 TABLET, FILM COATED ORAL DAILY
Qty: 0 | Refills: 0 | Status: DISCONTINUED | OUTPATIENT
Start: 2018-09-21 | End: 2018-09-25

## 2018-09-21 RX ORDER — PIPERACILLIN AND TAZOBACTAM 4; .5 G/20ML; G/20ML
3.38 INJECTION, POWDER, LYOPHILIZED, FOR SOLUTION INTRAVENOUS EVERY 8 HOURS
Qty: 0 | Refills: 0 | Status: DISCONTINUED | OUTPATIENT
Start: 2018-09-21 | End: 2018-09-25

## 2018-09-21 RX ORDER — VANCOMYCIN HCL 1 G
1000 VIAL (EA) INTRAVENOUS ONCE
Qty: 0 | Refills: 0 | Status: COMPLETED | OUTPATIENT
Start: 2018-09-21 | End: 2018-09-21

## 2018-09-21 RX ORDER — SENNA PLUS 8.6 MG/1
2 TABLET ORAL AT BEDTIME
Qty: 0 | Refills: 0 | Status: DISCONTINUED | OUTPATIENT
Start: 2018-09-21 | End: 2018-09-21

## 2018-09-21 RX ORDER — DIVALPROEX SODIUM 500 MG/1
125 TABLET, DELAYED RELEASE ORAL
Qty: 0 | Refills: 0 | Status: DISCONTINUED | OUTPATIENT
Start: 2018-09-21 | End: 2018-09-25

## 2018-09-21 RX ORDER — DOCUSATE SODIUM 100 MG
1 CAPSULE ORAL
Qty: 0 | Refills: 0 | COMMUNITY

## 2018-09-21 RX ORDER — ACETAMINOPHEN 500 MG
650 TABLET ORAL ONCE
Qty: 0 | Refills: 0 | Status: DISCONTINUED | OUTPATIENT
Start: 2018-09-21 | End: 2018-09-21

## 2018-09-21 RX ADMIN — QUETIAPINE FUMARATE 100 MILLIGRAM(S): 200 TABLET, FILM COATED ORAL at 23:24

## 2018-09-21 RX ADMIN — Medication 975 MILLIGRAM(S): at 13:58

## 2018-09-21 RX ADMIN — DORZOLAMIDE HYDROCHLORIDE TIMOLOL MALEATE 1 DROP(S): 20; 5 SOLUTION/ DROPS OPHTHALMIC at 23:25

## 2018-09-21 RX ADMIN — DIVALPROEX SODIUM 125 MILLIGRAM(S): 500 TABLET, DELAYED RELEASE ORAL at 23:24

## 2018-09-21 RX ADMIN — PIPERACILLIN AND TAZOBACTAM 3.38 GRAM(S): 4; .5 INJECTION, POWDER, LYOPHILIZED, FOR SOLUTION INTRAVENOUS at 14:30

## 2018-09-21 RX ADMIN — QUETIAPINE FUMARATE 25 MILLIGRAM(S): 200 TABLET, FILM COATED ORAL at 18:13

## 2018-09-21 RX ADMIN — SODIUM CHLORIDE 1750 MILLILITER(S): 9 INJECTION INTRAMUSCULAR; INTRAVENOUS; SUBCUTANEOUS at 13:57

## 2018-09-21 RX ADMIN — SODIUM CHLORIDE 1750 MILLILITER(S): 9 INJECTION INTRAMUSCULAR; INTRAVENOUS; SUBCUTANEOUS at 12:30

## 2018-09-21 RX ADMIN — Medication 0.25 MILLIGRAM(S): at 23:25

## 2018-09-21 RX ADMIN — Medication 3 MILLIGRAM(S): at 23:25

## 2018-09-21 RX ADMIN — PIPERACILLIN AND TAZOBACTAM 200 GRAM(S): 4; .5 INJECTION, POWDER, LYOPHILIZED, FOR SOLUTION INTRAVENOUS at 13:56

## 2018-09-21 RX ADMIN — PIPERACILLIN AND TAZOBACTAM 25 GRAM(S): 4; .5 INJECTION, POWDER, LYOPHILIZED, FOR SOLUTION INTRAVENOUS at 23:23

## 2018-09-21 RX ADMIN — Medication 10 MILLIGRAM(S): at 18:13

## 2018-09-21 RX ADMIN — Medication 250 MILLIGRAM(S): at 16:35

## 2018-09-21 NOTE — H&P ADULT - PROBLEM SELECTOR PLAN 1
- Enterovirus /  Rhinovirus /  PNA suspected gram negative organism  - IV rocephin  - IV hydration  - ID consulted  - CT of the abdomen: No abscess identified. Right lower lobe infiltrate, Small amount of free fluid in the pelvis, Hepatic cysts, Distended gallbladder. - Enterovirus /  Rhinovirus /  PNA suspected gram negative organism  - IV Rocephin  - IV hydration  - ID consulted  - CT of the abdomen: No abscess identified. Right lower lobe infiltrate, Small amount of free fluid in the pelvis, Hepatic cysts, Distended gallbladder. - Enterovirus /  Rhinovirus /  PNA suspected gram negative organism  - IV zosyn /  vancomycin  - IV hydration  - ID consulted  - CT of the abdomen: No abscess identified. Right lower lobe infiltrate, Small amount of free fluid in the pelvis, Hepatic cysts, Distended gallbladder.

## 2018-09-21 NOTE — H&P ADULT - PROBLEM SELECTOR PLAN 2
- Enterovirus /  Rhinovirus /  PNA suspected gram negative organism  - IV rocephin  - IV hydration  - ID consulted - Enterovirus /  Rhinovirus /  PNA suspected gram negative organism  - IV Rocephin  - IV hydration  - ID consulted - Enterovirus /  Rhinovirus /  PNA suspected gram negative organism  - IV zosyn /  vancomycin  - IV hydration  - ID consulted

## 2018-09-21 NOTE — BEHAVIORAL HEALTH ASSESSMENT NOTE - NSBHCHARTREVIEWINVESTIGATE_PSY_A_CORE FT
Ventricular Rate 69 BPM    Atrial Rate 69 BPM    P-R Interval 134 ms    QRS Duration 112 ms    Q-T Interval 446 ms    QTC Calculation(Bezet) 477 ms    P Axis 82 degrees    R Axis -25 degrees    T Axis 75 degrees    Diagnosis Line Normal sinus rhythm  Normal ECG  No previous ECGs available  Confirmed by Timothy AGUILERA, Jeffy ([id number]) on 8/6/2018 2:51:05 PM   see EKG today on paper in chart qtc 450

## 2018-09-21 NOTE — BEHAVIORAL HEALTH ASSESSMENT NOTE - SUMMARY
73 yowf diagnosed with late stage dementia per HCP and now lives on memory unit at [facility].  Psych consulted for agitation, which daughter reports much of her current behavior is not new and agreed to eval offered on admission.  Pt on Xanax 0.25 tid, and Seroquel which was increased from 25 bid to 50 bid.  She is also prescribed Divalproate sprinkle caps 125 bid.  Pt has PPH Anxiety and on record OCD but daughter reported  she was not officially diagnosed with OCD as family thought she had it due to extreme cleaning habit's.  Pt became progressively more anxious with age prior to dementia diagnosis.      Consult was requested for agitation, however currently nonverbal with occass whimpering and eyes closed.  Daughter reports agitation is at baseline and she had to hire private aid for when she gets personal  care due to agitation.  Cannot rule out Delirium superimposed on Dementia  Recommend 1 extra prn Xanax 0.25, prior to blood draw or procedure or schedule procedure, blood draw just after standing dose Xanax.  Hold for sedation.  Seroquel was just increased to 50 bid on 9/20 hold with qtc>500.  Monitor sleep, continue Melatonin

## 2018-09-21 NOTE — ED PROVIDER NOTE - CARE PLAN
Principal Discharge DX:	Bacterial pneumonia  Secondary Diagnosis:	Enterovirus infection  Secondary Diagnosis:	Sepsis

## 2018-09-21 NOTE — H&P ADULT - HISTORY OF PRESENT ILLNESS
Patient is a 72 y/o/f with pmhx of dementia presents to the ED from [facility] c/o fever and decreased PO intake. Denies cough, NVD, abd pain. As per family, pt is at baseline neurologically.  As per aid, numerous people at [facility] have been sick with URI's. Full HPI unobtainable due to patient being nonverbal. Patient is a 72 y/o/f with pmhx of dementia, OCD / Anxiety, glaucoma admitted from [facility] facility with noted temp of 102,cough and congestion. Patient is extremely poor historian due to underlying dementia. As per family, they have not noted any cough, but they indicate that several of the [facility] patients were recently sick and one of them was recently discharged from [facility].  As per family, pt is at baseline neurologically.  . Full HPI unobtainable due to patient being nonverbal and agitated     Code Status: DNR/DNI

## 2018-09-21 NOTE — ED PROVIDER NOTE - PROGRESS NOTE DETAILS
JW Justification for admission Sepsis, Entero virus infection, lobar PNA on CXR.  Pt elderly, SIRS 4+ with source.  Given presentation and findings, patient requires hospitalization.  Pt signed out to Dr. Whitfield.  Patient's history, vital signs, lab results, imaging, pertinent findings, and clinical condition reviewed during sign out.  At sign out patient admitted in hemodynamically stable condition in no acute distress.

## 2018-09-21 NOTE — BEHAVIORAL HEALTH ASSESSMENT NOTE - NSBHCHARTREVIEWVS_PSY_A_CORE FT
ICU Vital Signs Last 24 Hrs  T(C): 36.6 (21 Sep 2018 19:32), Max: 40 (21 Sep 2018 12:30)  T(F): 97.8 (21 Sep 2018 19:32), Max: 104 (21 Sep 2018 12:30)  HR: 71 (21 Sep 2018 19:32) (71 - 90)  BP: 117/49 (21 Sep 2018 19:32) (106/55 - 134/75)  BP(mean): --  ABP: --  ABP(mean): --  RR: 18 (21 Sep 2018 19:32) (13 - 19)  SpO2: 98% (21 Sep 2018 19:32) (95% - 98%)

## 2018-09-21 NOTE — H&P ADULT - PROBLEM/PLAN-2
Resolved  Patient with fever to 101.4 F on admission  Likely secondary to acute pancreatitis  Invanz and florastor stopped
Patient with fever to 101.4 F on admission, 100.4 overnight  Likely secondary to acute pancreatitis  will evaluate with MRI abdomen to assess for other source of infection  Invanz 1g QD IV now day 2 for empiric gram negative and anaerobic coverage with florastor BID
Resolved  Patient with fever to 101.4 F on admission  Likely secondary to acute pancreatitis  Invanz and florastor stopped
DISPLAY PLAN FREE TEXT

## 2018-09-21 NOTE — H&P ADULT - NSHPREVIEWOFSYSTEMS_GEN_ALL_CORE
REVIEW OF SYSTEMS:  General: NAD, hemodynamically stable, (-)  fever, (-) chills, (+) weakness  HEENT:  Eyes:  No visual loss, blurred vision, double vision or yellow sclerae. Ears, Nose, Throat:  No hearing loss, sneezing, congestion, runny nose or sore throat.  SKIN:  No rash or itching.  CARDIOVASCULAR:  No chest pain, chest pressure or chest discomfort. No palpitations or edema.  RESPIRATORY:  No shortness of breath, cough or sputum.  GASTROINTESTINAL:  No anorexia, nausea, vomiting or diarrhea. No abdominal pain or blood.  NEUROLOGICAL:  No headache, dizziness, syncope, paralysis, ataxia, numbness or tingling in the extremities. No change in bowel or bladder control.  MUSCULOSKELETAL:  No muscle, back pain, joint pain or stiffness.  HEMATOLOGIC:  No anemia, bleeding or bruising.  LYMPHATICS:  No enlarged nodes. No history of splenectomy.  ENDOCRINOLOGIC:  No reports of sweating, cold or heat intolerance. No polyuria or polydipsia.  ALLERGIES:  No history of asthma, hives, eczema or rhinitis. REVIEW OF SYSTEMS:  No history obtained from the patient given severe dementia

## 2018-09-21 NOTE — BEHAVIORAL HEALTH ASSESSMENT NOTE - NSBHCHARTREVIEWIMAGING_PSY_A_CORE FT
IMPRESSION:  No abscess identified.  Right lower lobe infiltrate  Small amount of free fluid in the pelvis  Hepatic cysts  Distended gallbladder

## 2018-09-21 NOTE — ED PROVIDER NOTE - NS_ ATTENDINGSCRIBEDETAILS _ED_A_ED_FT
The scribe's documentation has been prepared under my direction and personally reviewed by me in its entirety.  I confirm that the note above accurately reflects all my work, treatment, procedures, and decision making except where otherwise noted or amended by me.  Long Mcnair M.D.

## 2018-09-21 NOTE — ED PROVIDER NOTE - MEDICAL DECISION MAKING DETAILS
74 y/o female with PMHx of dementia presents to the ED from [facility] c/o fever and decreased PO intake. 72 y/o female with PMHx of dementia presents to the ED from [facility] c/o fever and decreased PO intake.  PT meets criteria for sepsis.  Evaluate and treat per sepsis protocol.  Source control, symptomatic treatment, admit.

## 2018-09-21 NOTE — BEHAVIORAL HEALTH ASSESSMENT NOTE - NSBHREFERDETAILS_PSY_A_CORE_FT
Patient is a 72 y/o/f with pmhx of dementia, OCD / Anxiety, glaucoma admitted from [facility] facility with noted temp of 102,cough and congestion. Patient is extremely poor historian due to underlying dementia. As per family, they have not noted any cough, but they indicate that several of the [facility] patients were recently sick and one of them was recently discharged from [facility].  As per family, pt is at baseline neurologically.  . Full HPI unobtainable due to patient being nonverbal and agitated

## 2018-09-21 NOTE — BEHAVIORAL HEALTH ASSESSMENT NOTE - HPI (INCLUDE ILLNESS QUALITY, SEVERITY, DURATION, TIMING, CONTEXT, MODIFYING FACTORS, ASSOCIATED SIGNS AND SYMPTOMS)
73 yowf diagnosed with late stage dementia per HCP and now lives on memory unit at [facility].  Psych consulted for agitation, which daughter reports much of her current behavior is not new and agreed to eval offered on admission.  Pt on Xanax 0.25 tid, and Seroquel which was increased from 25 bid to 50 bid.  She is also prescribed Divalproate sprinkle caps 125 bid.  Pt has PPH Anxiety and on record OCD but daughter reported  she was not officially diagnosed with OCD as family thought she had it due to extreme cleaning habit's.  Pt became progressively more anxious with age prior to dementia diagnosis.  She takes Melatonin for sleep  Pt is nonverbal at baseline with chronic crying, and repeating statements ie "help, I'm scared" throughout day in NH, but is poorly engaged in interactions with others.  Pt was on Paxil in past for Anxiety which was d/c 8/23/18 by Dr Wiggins at [facility], daughter does not know why.  Pt had recent elevation in QTC at 475, with last one noted today at 450.  Daughter also reports Pt becomes agitated when approached for personal care and becomes combative.  RN reports how agitated and combative Pt became with  attempts at blood draw and reported will try in am.  Daughter reports Pt was admitted to  [facility] [place] [facility] with recurrence 2 weeks ago.  Pt admitted today with PNA sepsis following temp of 104 in NH.    Interview was attempted, Pt in room calm with eyes closed but awake.  Pt was asked her name and she would open her eyes then close them without answering.  Several attempts to have interaction and Pt was uncooperative.  Pt with occasionally make whimpering sounds with attempts to wake her.

## 2018-09-21 NOTE — H&P ADULT - NSHPPHYSICALEXAM_GEN_ALL_CORE
Physical Exam:   GENERAL APPEARANCE:  NAD, hemodynamically stable  T(C): 37.5 (09-21-18 @ 15:38), Max: 40 (09-21-18 @ 12:30)  HR: 83 (09-21-18 @ 15:38) (83 - 90)  BP: 106/55 (09-21-18 @ 15:38) (106/55 - 134/75)  RR: 16 (09-21-18 @ 15:38) (15 - 19)  SpO2: 96% (09-21-18 @ 15:38) (95% - 98%)  Wt(kg): --  HEENT:  Head is normocephalic    Skin:  Warm and dry without any rash   NECK:  Supple without lymphadenopathy.   HEART:  Regular rate and rhythm. normal S1 and S2, No M/R/G  LUNGS:  Good ins/exp effort, no W/R/R/C  ABDOMEN:  Soft, nontender, nondistended with good bowel sounds heard  EXTREMITIES:  Without cyanosis, clubbing or edema.   NEUROLOGICAL:  Gross nonfocal Physical Exam:   GENERAL APPEARANCE:  deconditioned, frail, patient agitated and crying.   T(C): 37.5 (09-21-18 @ 15:38), Max: 40 (09-21-18 @ 12:30)  HR: 83 (09-21-18 @ 15:38) (83 - 90)  BP: 106/55 (09-21-18 @ 15:38) (106/55 - 134/75)  RR: 16 (09-21-18 @ 15:38) (15 - 19)  SpO2: 96% (09-21-18 @ 15:38) (95% - 98%)    HEENT:  Head is normocephalic    Skin:  decreased skin turgor /  dry thin skin  NECK:  Supple without lymphadenopathy.   HEART:  Regular rate and rhythm. normal S1 and S2, No M/R/G  LUNGS:  Unable to complete lung exam given patient's inability to follow verbal command of taking a deep breath.   ABDOMEN:  Soft, nontender, nondistended with good bowel sounds heard  EXTREMITIES:  Without cyanosis, clubbing or edema.   NEUROLOGICAL:  severe dementia

## 2018-09-21 NOTE — ED ADULT NURSE NOTE - OBJECTIVE STATEMENT
sent in from [facility] assisted living dementia unit for cough, congestion and fever TMax 102, pt had sick contact from facility, other patient's has had viral pneumonia and bronchitis,   HX: dementia, anxiety, glaucoma

## 2018-09-21 NOTE — ED PROVIDER NOTE - OBJECTIVE STATEMENT
74 y/o female with PMHx of dementia presents to the ED from [facility] c/o fever and decreased PO intake. Denies cough, NVD, abd pain. As per family, pt is at baseline neurologically.  As per aid, numerous people at [facility] have been sick with URI's. Full HPI unobtainable due to patient being nonverbal.

## 2018-09-21 NOTE — H&P ADULT - NSHPLABSRESULTS_GEN_ALL_CORE
CBC Full  -  ( 21 Sep 2018 12:36 )  WBC Count : 10.49 K/uL  Hemoglobin : 11.3 g/dL  Hematocrit : 34.7 %  Platelet Count - Automated : 201 K/uL    PT/INR - ( 21 Sep 2018 12:36 )   PT: 13.6 sec;   INR: 1.25 ratio      PTT - ( 21 Sep 2018 12:36 )  PTT:27.4 sec  Urinalysis Basic - ( 21 Sep 2018 12:36 )    Color: Yellow / Appearance: Clear / S.010 / pH: x  Gluc: x / Ketone: Moderate  / Bili: Negative / Urobili: 1 mg/dL   Blood: x / Protein: 15 mg/dL / Nitrite: Negative   Leuk Esterase: Trace / RBC: 18 to 20 /HPF / WBC 3-5   Sq Epi: x / Non Sq Epi: x / Bacteria: x    141  |  104  |  12  ----------------------------<  88  4.0   |  29  |  0.53    Ca    8.4<L>      21 Sep 2018 12:36    TPro  7.2  /  Alb  2.4<L>  /  TBili  0.6  /  DBili  x   /  AST  26  /  ALT  15  /  AlkPhos  69  -

## 2018-09-22 DIAGNOSIS — N39.0 URINARY TRACT INFECTION, SITE NOT SPECIFIED: ICD-10-CM

## 2018-09-22 LAB
ALBUMIN SERPL ELPH-MCNC: 1.9 G/DL — LOW (ref 3.3–5)
ALP SERPL-CCNC: 65 U/L — SIGNIFICANT CHANGE UP (ref 40–120)
ALT FLD-CCNC: 10 U/L — LOW (ref 12–78)
ANION GAP SERPL CALC-SCNC: 8 MMOL/L — SIGNIFICANT CHANGE UP (ref 5–17)
AST SERPL-CCNC: 15 U/L — SIGNIFICANT CHANGE UP (ref 15–37)
BASOPHILS # BLD AUTO: 0.05 K/UL — SIGNIFICANT CHANGE UP (ref 0–0.2)
BASOPHILS NFR BLD AUTO: 0.4 % — SIGNIFICANT CHANGE UP (ref 0–2)
BILIRUB SERPL-MCNC: 0.6 MG/DL — SIGNIFICANT CHANGE UP (ref 0.2–1.2)
BUN SERPL-MCNC: 11 MG/DL — SIGNIFICANT CHANGE UP (ref 7–23)
CALCIUM SERPL-MCNC: 8 MG/DL — LOW (ref 8.5–10.1)
CHLORIDE SERPL-SCNC: 108 MMOL/L — SIGNIFICANT CHANGE UP (ref 96–108)
CHOLEST SERPL-MCNC: 106 MG/DL — SIGNIFICANT CHANGE UP (ref 10–199)
CO2 SERPL-SCNC: 25 MMOL/L — SIGNIFICANT CHANGE UP (ref 22–31)
CREAT SERPL-MCNC: 0.4 MG/DL — LOW (ref 0.5–1.3)
CULTURE RESULTS: NO GROWTH — SIGNIFICANT CHANGE UP
EOSINOPHIL # BLD AUTO: 0.03 K/UL — SIGNIFICANT CHANGE UP (ref 0–0.5)
EOSINOPHIL NFR BLD AUTO: 0.3 % — SIGNIFICANT CHANGE UP (ref 0–6)
GLUCOSE SERPL-MCNC: 96 MG/DL — SIGNIFICANT CHANGE UP (ref 70–99)
HCT VFR BLD CALC: 29.2 % — LOW (ref 34.5–45)
HDLC SERPL-MCNC: 41 MG/DL — LOW
HGB BLD-MCNC: 9.5 G/DL — LOW (ref 11.5–15.5)
IMM GRANULOCYTES NFR BLD AUTO: 0.9 % — SIGNIFICANT CHANGE UP (ref 0–1.5)
LIPID PNL WITH DIRECT LDL SERPL: 54 MG/DL — SIGNIFICANT CHANGE UP
LYMPHOCYTES # BLD AUTO: 0.58 K/UL — LOW (ref 1–3.3)
LYMPHOCYTES # BLD AUTO: 4.8 % — LOW (ref 13–44)
MCHC RBC-ENTMCNC: 31.6 PG — SIGNIFICANT CHANGE UP (ref 27–34)
MCHC RBC-ENTMCNC: 32.5 GM/DL — SIGNIFICANT CHANGE UP (ref 32–36)
MCV RBC AUTO: 97 FL — SIGNIFICANT CHANGE UP (ref 80–100)
MONOCYTES # BLD AUTO: 1.2 K/UL — HIGH (ref 0–0.9)
MONOCYTES NFR BLD AUTO: 10 % — SIGNIFICANT CHANGE UP (ref 2–14)
NEUTROPHILS # BLD AUTO: 10.01 K/UL — HIGH (ref 1.8–7.4)
NEUTROPHILS NFR BLD AUTO: 83.6 % — HIGH (ref 43–77)
NRBC # BLD: 0 /100 WBCS — SIGNIFICANT CHANGE UP (ref 0–0)
PLATELET # BLD AUTO: 175 K/UL — SIGNIFICANT CHANGE UP (ref 150–400)
POTASSIUM SERPL-MCNC: 3.5 MMOL/L — SIGNIFICANT CHANGE UP (ref 3.5–5.3)
POTASSIUM SERPL-SCNC: 3.5 MMOL/L — SIGNIFICANT CHANGE UP (ref 3.5–5.3)
PROT SERPL-MCNC: 6.1 GM/DL — SIGNIFICANT CHANGE UP (ref 6–8.3)
RBC # BLD: 3.01 M/UL — LOW (ref 3.8–5.2)
RBC # FLD: 12.3 % — SIGNIFICANT CHANGE UP (ref 10.3–14.5)
SODIUM SERPL-SCNC: 141 MMOL/L — SIGNIFICANT CHANGE UP (ref 135–145)
SPECIMEN SOURCE: SIGNIFICANT CHANGE UP
T3 SERPL-MCNC: 72 NG/DL — LOW (ref 80–200)
T4 AB SER-ACNC: 5.1 UG/DL — SIGNIFICANT CHANGE UP (ref 4.6–12)
TOTAL CHOLESTEROL/HDL RATIO MEASUREMENT: 2.6 RATIO — LOW (ref 3.3–7.1)
TRIGL SERPL-MCNC: 57 MG/DL — SIGNIFICANT CHANGE UP (ref 10–149)
TSH SERPL-MCNC: 0.88 UU/ML — SIGNIFICANT CHANGE UP (ref 0.34–4.82)
WBC # BLD: 11.98 K/UL — HIGH (ref 3.8–10.5)
WBC # FLD AUTO: 11.98 K/UL — HIGH (ref 3.8–10.5)

## 2018-09-22 RX ORDER — VANCOMYCIN HCL 1 G
750 VIAL (EA) INTRAVENOUS EVERY 12 HOURS
Qty: 0 | Refills: 0 | Status: DISCONTINUED | OUTPATIENT
Start: 2018-09-22 | End: 2018-09-24

## 2018-09-22 RX ORDER — HEPARIN SODIUM 5000 [USP'U]/ML
5000 INJECTION INTRAVENOUS; SUBCUTANEOUS EVERY 12 HOURS
Qty: 0 | Refills: 0 | Status: DISCONTINUED | OUTPATIENT
Start: 2018-09-22 | End: 2018-09-25

## 2018-09-22 RX ORDER — SODIUM CHLORIDE 9 MG/ML
250 INJECTION INTRAMUSCULAR; INTRAVENOUS; SUBCUTANEOUS
Qty: 0 | Refills: 0 | Status: DISCONTINUED | OUTPATIENT
Start: 2018-09-22 | End: 2018-09-23

## 2018-09-22 RX ORDER — SODIUM CHLORIDE 9 MG/ML
1000 INJECTION INTRAMUSCULAR; INTRAVENOUS; SUBCUTANEOUS
Qty: 0 | Refills: 0 | Status: COMPLETED | OUTPATIENT
Start: 2018-09-22 | End: 2018-09-23

## 2018-09-22 RX ORDER — ACETAMINOPHEN 500 MG
650 TABLET ORAL EVERY 6 HOURS
Qty: 0 | Refills: 0 | Status: DISCONTINUED | OUTPATIENT
Start: 2018-09-22 | End: 2018-09-25

## 2018-09-22 RX ORDER — INFLUENZA VIRUS VACCINE 15; 15; 15; 15 UG/.5ML; UG/.5ML; UG/.5ML; UG/.5ML
0.5 SUSPENSION INTRAMUSCULAR ONCE
Qty: 0 | Refills: 0 | Status: COMPLETED | OUTPATIENT
Start: 2018-09-22 | End: 2018-09-25

## 2018-09-22 RX ADMIN — Medication 250 MILLIGRAM(S): at 18:43

## 2018-09-22 RX ADMIN — DIVALPROEX SODIUM 125 MILLIGRAM(S): 500 TABLET, DELAYED RELEASE ORAL at 17:33

## 2018-09-22 RX ADMIN — HEPARIN SODIUM 5000 UNIT(S): 5000 INJECTION INTRAVENOUS; SUBCUTANEOUS at 18:36

## 2018-09-22 RX ADMIN — DORZOLAMIDE HYDROCHLORIDE TIMOLOL MALEATE 1 DROP(S): 20; 5 SOLUTION/ DROPS OPHTHALMIC at 07:03

## 2018-09-22 RX ADMIN — DIVALPROEX SODIUM 125 MILLIGRAM(S): 500 TABLET, DELAYED RELEASE ORAL at 07:03

## 2018-09-22 RX ADMIN — QUETIAPINE FUMARATE 100 MILLIGRAM(S): 200 TABLET, FILM COATED ORAL at 21:49

## 2018-09-22 RX ADMIN — Medication 650 MILLIGRAM(S): at 03:44

## 2018-09-22 RX ADMIN — QUETIAPINE FUMARATE 25 MILLIGRAM(S): 200 TABLET, FILM COATED ORAL at 11:11

## 2018-09-22 RX ADMIN — PIPERACILLIN AND TAZOBACTAM 25 GRAM(S): 4; .5 INJECTION, POWDER, LYOPHILIZED, FOR SOLUTION INTRAVENOUS at 07:02

## 2018-09-22 RX ADMIN — DORZOLAMIDE HYDROCHLORIDE TIMOLOL MALEATE 1 DROP(S): 20; 5 SOLUTION/ DROPS OPHTHALMIC at 17:33

## 2018-09-22 RX ADMIN — PIPERACILLIN AND TAZOBACTAM 25 GRAM(S): 4; .5 INJECTION, POWDER, LYOPHILIZED, FOR SOLUTION INTRAVENOUS at 14:22

## 2018-09-22 RX ADMIN — Medication 250 MILLIGRAM(S): at 05:04

## 2018-09-22 RX ADMIN — PIPERACILLIN AND TAZOBACTAM 25 GRAM(S): 4; .5 INJECTION, POWDER, LYOPHILIZED, FOR SOLUTION INTRAVENOUS at 21:49

## 2018-09-22 RX ADMIN — Medication 10 MILLIGRAM(S): at 11:11

## 2018-09-22 RX ADMIN — Medication 650 MILLIGRAM(S): at 14:39

## 2018-09-22 RX ADMIN — Medication 3 MILLIGRAM(S): at 21:49

## 2018-09-22 RX ADMIN — Medication 0.25 MILLIGRAM(S): at 07:03

## 2018-09-22 NOTE — CONSULT NOTE ADULT - ASSESSMENT
74 y/o female with h/o dementia, OCD, anxiety disorder, glaucoma was admitted on 9/21 from [facility] facility for fever to 102F, cough and congestion. Patient is extremely poor historian due to underlying dementia. The patient is nonverbal and restless. In ER, she received zosyn and vancomycin IV.    1. Febrile syndrome. Possible sepsis. RLL pneumonia. Possible aspiration pneumonia. URI with rhinovirus.  -obtain BC x 2  -agree with zosyn 3.375 gm IV q8h and vancomycin 750 mg IV q12h  -reason for abx use and side effects reviewed with patient; monitor BMP and vancomycin trough levels   -respiratory care  -old chart reviewed to assess prior cultures  -monitor temps  -f/u CBC  -supportive care  2. Other issues:   -care per medicine

## 2018-09-22 NOTE — PROGRESS NOTE ADULT - SUBJECTIVE AND OBJECTIVE BOX
Subjective:  no distress  non verbal  daughter at bedside    MEDICATIONS  (STANDING):  ALPRAZolam 0.25 milliGRAM(s) Oral three times a day  diVALproex Oral Sprinkle Capsule - Peds 125 milliGRAM(s) Oral two times a day  dorzolamide 2%/timolol 0.5% Ophthalmic Solution 1 Drop(s) Both EYES two times a day  influenza   Vaccine 0.5 milliLiter(s) IntraMuscular once  melatonin 3 milliGRAM(s) Oral at bedtime  PARoxetine 10 milliGRAM(s) Oral daily  piperacillin/tazobactam IVPB. 3.375 Gram(s) IV Intermittent every 8 hours  QUEtiapine 100 milliGRAM(s) Oral at bedtime  QUEtiapine 25 milliGRAM(s) Oral daily  vancomycin  IVPB 750 milliGRAM(s) IV Intermittent every 12 hours    MEDICATIONS  (PRN):  acetaminophen  Suppository .. 650 milliGRAM(s) Rectal every 6 hours PRN Temp greater or equal to 38C (100.4F), Mild Pain (1 - 3)      Allergies    No Known Allergies        Vital Signs Last 24 Hrs  T(C): 38.4 (22 Sep 2018 02:51), Max: 38.4 (22 Sep 2018 02:51)  T(F): 101.2 (22 Sep 2018 02:51), Max: 101.2 (22 Sep 2018 02:51)  HR: 71 (21 Sep 2018 19:32) (71 - 83)  BP: 148/53 (22 Sep 2018 02:51) (106/55 - 148/53)  BP(mean): --  RR: 18 (21 Sep 2018 19:32) (13 - 18)  SpO2: 98% (21 Sep 2018 19:32) (96% - 98%)    PHYSICAL EXAMINATION:  SKIN: no rashes  HEAD: NC/AT  EYES: PERRLA, EOMI  EARS: TM's intact  NOSE: no abnormalities  NECK:  Supple. No lymphadenopathy. Jugular venous pressure not elevated. Carotids equal.   HEART:   S1S2 reg  CHEST:  bilat ronchi.  ABDOMEN:  Soft and nontender.   EXTREMITIES:  no C/C/E  NEURO: contracted       LABS:                        9.5    11.98 )-----------( 175      ( 22 Sep 2018 04:47 )             29.2     09-22    141  |  108  |  11  ----------------------------<  96  3.5   |  25  |  0.40<L>    Ca    8.0<L>      22 Sep 2018 04:47    TPro  6.1  /  Alb  1.9<L>  /  TBili  0.6  /  DBili  x   /  AST  15  /  ALT  10<L>  /  AlkPhos  65  09-22    PT/INR - ( 21 Sep 2018 12:36 )   PT: 13.6 sec;   INR: 1.25 ratio         PTT - ( 21 Sep 2018 12:36 )  PTT:27.4 sec  Urinalysis Basic - ( 21 Sep 2018 12:36 )    Color: Yellow / Appearance: Clear / S.010 / pH: x  Gluc: x / Ketone: Moderate  / Bili: Negative / Urobili: 1 mg/dL   Blood: x / Protein: 15 mg/dL / Nitrite: Negative   Leuk Esterase: Trace / RBC: 18 to 20 /HPF / WBC 3-5   Sq Epi: x / Non Sq Epi: x / Bacteria: x        RADIOLOGY & ADDITIONAL TESTS:

## 2018-09-22 NOTE — CONSULT NOTE ADULT - ASSESSMENT
PROBLEMS:    Bacterial pneumonia- RLL- hcap/ ASPIRATION  Enterovirus /  Rhinovirus  Dementia  Anxiety    PLAN:    isolation  iv zosyn  supportive care  dvt prophylasix

## 2018-09-23 RX ORDER — POTASSIUM CHLORIDE 20 MEQ
20 PACKET (EA) ORAL ONCE
Qty: 0 | Refills: 0 | Status: COMPLETED | OUTPATIENT
Start: 2018-09-23 | End: 2018-09-23

## 2018-09-23 RX ADMIN — DORZOLAMIDE HYDROCHLORIDE TIMOLOL MALEATE 1 DROP(S): 20; 5 SOLUTION/ DROPS OPHTHALMIC at 17:11

## 2018-09-23 RX ADMIN — Medication 250 MILLIGRAM(S): at 19:13

## 2018-09-23 RX ADMIN — SODIUM CHLORIDE 250 MILLILITER(S): 9 INJECTION INTRAMUSCULAR; INTRAVENOUS; SUBCUTANEOUS at 00:02

## 2018-09-23 RX ADMIN — PIPERACILLIN AND TAZOBACTAM 25 GRAM(S): 4; .5 INJECTION, POWDER, LYOPHILIZED, FOR SOLUTION INTRAVENOUS at 22:15

## 2018-09-23 RX ADMIN — Medication 10 MILLIGRAM(S): at 12:35

## 2018-09-23 RX ADMIN — SODIUM CHLORIDE 75 MILLILITER(S): 9 INJECTION INTRAMUSCULAR; INTRAVENOUS; SUBCUTANEOUS at 00:48

## 2018-09-23 RX ADMIN — Medication 250 MILLIGRAM(S): at 04:26

## 2018-09-23 RX ADMIN — PIPERACILLIN AND TAZOBACTAM 25 GRAM(S): 4; .5 INJECTION, POWDER, LYOPHILIZED, FOR SOLUTION INTRAVENOUS at 06:08

## 2018-09-23 RX ADMIN — QUETIAPINE FUMARATE 100 MILLIGRAM(S): 200 TABLET, FILM COATED ORAL at 22:14

## 2018-09-23 RX ADMIN — HEPARIN SODIUM 5000 UNIT(S): 5000 INJECTION INTRAVENOUS; SUBCUTANEOUS at 04:29

## 2018-09-23 RX ADMIN — DORZOLAMIDE HYDROCHLORIDE TIMOLOL MALEATE 1 DROP(S): 20; 5 SOLUTION/ DROPS OPHTHALMIC at 04:32

## 2018-09-23 RX ADMIN — HEPARIN SODIUM 5000 UNIT(S): 5000 INJECTION INTRAVENOUS; SUBCUTANEOUS at 17:11

## 2018-09-23 RX ADMIN — PIPERACILLIN AND TAZOBACTAM 25 GRAM(S): 4; .5 INJECTION, POWDER, LYOPHILIZED, FOR SOLUTION INTRAVENOUS at 14:19

## 2018-09-23 RX ADMIN — DIVALPROEX SODIUM 125 MILLIGRAM(S): 500 TABLET, DELAYED RELEASE ORAL at 04:34

## 2018-09-23 RX ADMIN — Medication 20 MILLIEQUIVALENT(S): at 17:11

## 2018-09-23 RX ADMIN — DIVALPROEX SODIUM 125 MILLIGRAM(S): 500 TABLET, DELAYED RELEASE ORAL at 17:11

## 2018-09-23 RX ADMIN — Medication 0.25 MILLIGRAM(S): at 04:34

## 2018-09-23 RX ADMIN — QUETIAPINE FUMARATE 25 MILLIGRAM(S): 200 TABLET, FILM COATED ORAL at 12:35

## 2018-09-23 RX ADMIN — Medication 3 MILLIGRAM(S): at 22:12

## 2018-09-23 NOTE — PROGRESS NOTE ADULT - SUBJECTIVE AND OBJECTIVE BOX
72 y/o WF with pmhx of Dementia, OCD / Anxiety, Glaucoma admitted from [facility] facility with noted temp of 102,cough and congestion. Patient is extremely poor historian due to underlying dementia. As per family, they have not noted any cough, but they indicate that several of the [facility] patients were recently sick and one of them was recently discharged from [facility].  As per family, pt is at baseline neurologically.  . Full HPI unobtainable due to patient being nonverbal and agitated     9.23: non verbal, not follows commands         REVIEW OF SYSTEMS:    CONSTITUTIONAL: No weakness, No fevers or chills  ENT: No ear ache, No sorethroat  NECK: No pain, No stiffness  RESPIRATORY: No cough, No wheezing, No hemoptysis; No dyspnea  CARDIOVASCULAR: No chest pain, No palpitations  GASTROINTESTINAL: No abd pain, No nausea, No vomiting, No hematemesis, No diarrhea or constipation. No melena, No hematochezia.  GENITOURINARY: No dysuria, No  hematuria  NEUROLOGICAL: No diplopia, No paresthesia, No motor dysfunction  MUSCULOSKELETAL: No arthralgia, No myalgia  SKIN: No rashes, or lesions   PSYCH: no anxiety, no suicidal ideation    All other review of systems is negative unless indicated above    Vital Signs Last 24 Hrs  T(C): 37.5 (23 Sep 2018 14:28), Max: 37.8 (22 Sep 2018 17:47)  T(F): 99.5 (23 Sep 2018 14:28), Max: 100.1 (22 Sep 2018 17:47)  HR: 69 (23 Sep 2018 11:59) (62 - 86)  BP: 107/50 (23 Sep 2018 11:59) (84/49 - 137/63)  BP(mean): --  RR: 20 (23 Sep 2018 11:59) (18 - 20)  SpO2: 96% (23 Sep 2018 11:59) (96% - 100%)    PHYSICAL EXAM:    GENERAL: NAD, Well nourished  HEENT:  NC/AT, EOMI, PERRLA, No scleral icterus, Moist mucous membranes  NECK: Supple, No JVD  CNS:  Alert & Oriented X3, Motor Strength 5/5 B/L upper and lower extremities; DTRs 2+ intact   LUNG: Normal Breath sounds, Clear to auscultation bilaterally, No rales, No rhonchi, No wheezing  HEART: RRR; No murmurs, No rubs  ABDOMEN: +BS, ST/ND/NT  GENITOURINARY: Voiding, Bladder not distended  EXTREMITIES:  2+ Peripheral Pulses, No clubbing, No cyanosis, No tibial edema  MUSCULOSKELTAL: Joints normal ROM, No TTP, No effusion  VAGINAL: deferred  SKIN: no rashes  RECTAL: deferred, not indicated  BREAST: deferred                          9.5    11.98 )-----------( 175      ( 22 Sep 2018 04:47 )             29.2     09-22    141  |  108  |  11  ----------------------------<  96  3.5   |  25  |  0.40<L>    Ca    8.0<L>      22 Sep 2018 04:47    TPro  6.1  /  Alb  1.9<L>  /  TBili  0.6  /  DBili  x   /  AST  15  /  ALT  10<L>  /  AlkPhos  65  09-22    Vancomycin levels:   Cultures:     MEDICATIONS  (STANDING):  ALPRAZolam 0.25 milliGRAM(s) Oral three times a day  diVALproex Oral Sprinkle Capsule - Peds 125 milliGRAM(s) Oral two times a day  dorzolamide 2%/timolol 0.5% Ophthalmic Solution 1 Drop(s) Both EYES two times a day  heparin  Injectable 5000 Unit(s) SubCutaneous every 12 hours  influenza   Vaccine 0.5 milliLiter(s) IntraMuscular once  melatonin 3 milliGRAM(s) Oral at bedtime  PARoxetine 10 milliGRAM(s) Oral daily  piperacillin/tazobactam IVPB. 3.375 Gram(s) IV Intermittent every 8 hours  QUEtiapine 100 milliGRAM(s) Oral at bedtime  QUEtiapine 25 milliGRAM(s) Oral daily  sodium chloride 0.9%. 250 milliLiter(s) (250 mL/Hr) IV Continuous <Continuous>  vancomycin  IVPB 750 milliGRAM(s) IV Intermittent every 12 hours    MEDICATIONS  (PRN):  acetaminophen  Suppository .. 650 milliGRAM(s) Rectal every 6 hours PRN Temp greater or equal to 38C (100.4F), Mild Pain (1 - 3)      all labs reviewed  all imaging reviewed    < from: CT Abdomen and Pelvis w/ IV Cont (09.21.18 @ 14:04) >  IMPRESSION:  No abscess identified.  Right lower lobe infiltrate  Small amount of free fluid in the pelvis  Hepatic cysts  Distended gallbladder      Assessment and Plan:    1. RLL Pna suspect GNR bacteria:  Cefepime/Vancomycin day#3  Blood Cx negative  Bronchodilators prn    2. Dementia

## 2018-09-23 NOTE — PROGRESS NOTE ADULT - SUBJECTIVE AND OBJECTIVE BOX
Date of service: 18 @ 14:49    Sitting in bed in NAD  Confused  No SOB at rest    ROS: no fever or chills; unobtainable    MEDICATIONS  (STANDING):  ALPRAZolam 0.25 milliGRAM(s) Oral three times a day  diVALproex Oral Sprinkle Capsule - Peds 125 milliGRAM(s) Oral two times a day  dorzolamide 2%/timolol 0.5% Ophthalmic Solution 1 Drop(s) Both EYES two times a day  heparin  Injectable 5000 Unit(s) SubCutaneous every 12 hours  influenza   Vaccine 0.5 milliLiter(s) IntraMuscular once  melatonin 3 milliGRAM(s) Oral at bedtime  PARoxetine 10 milliGRAM(s) Oral daily  piperacillin/tazobactam IVPB. 3.375 Gram(s) IV Intermittent every 8 hours  potassium chloride   Powder 20 milliEquivalent(s) Oral once  QUEtiapine 100 milliGRAM(s) Oral at bedtime  QUEtiapine 25 milliGRAM(s) Oral daily  vancomycin  IVPB 750 milliGRAM(s) IV Intermittent every 12 hours      Vital Signs Last 24 Hrs  T(C): 37.5 (23 Sep 2018 14:28), Max: 37.8 (22 Sep 2018 17:47)  T(F): 99.5 (23 Sep 2018 14:28), Max: 100.1 (22 Sep 2018 17:47)  HR: 69 (23 Sep 2018 11:59) (62 - 86)  BP: 107/50 (23 Sep 2018 11:59) (84/49 - 137/63)  BP(mean): --  RR: 20 (23 Sep 2018 11:59) (18 - 20)  SpO2: 96% (23 Sep 2018 11:59) (96% - 100%)    Physical Exam:      Constitutional: frail looking  HEENT: NC/AT, EOMI, PERRLA, conjunctivae clear  Neck: supple; thyroid not palpable  Back: no tenderness  Respiratory: respiratory effort normal; crackles at right base  Cardiovascular: S1S2 regular, no murmurs  Abdomen: soft, not tender, not distended, positive BS  Genitourinary: no suprapubic tenderness  Lymphatic: no LN palpable  Musculoskeletal: no muscle tenderness, no joint swelling or tenderness  Extremities: no pedal edema  Neurological/ Psychiatric: confused, moving all extremities  Skin: no rashes; no palpable lesions    Labs: reviewed                 9.    11 )-----------( 175      ( 22 Sep 2018 04:47 )             29.2         141  |  108  |  11  ----------------------------<  96  3.5   |  25  |  0.40<L>    Ca    8.0<L>      22 Sep 2018 04:47    TPro  6.1  /  Alb  1.9<L>  /  TBili  0.6  /  DBili  x   /  AST  15  /  ALT  10<L>  /  AlkPhos  65       LIVER FUNCTIONS - ( 22 Sep 2018 04:47 )  Alb: 1.9 g/dL / Pro: 6.1 gm/dL / ALK PHOS: 65 U/L / ALT: 10 U/L / AST: 15 U/L / GGT: x           Urinalysis Basic - ( 21 Sep 2018 12:36 )    Color: Yellow / Appearance: Clear / S.010 / pH: x  Gluc: x / Ketone: Moderate  / Bili: Negative / Urobili: 1 mg/dL   Blood: x / Protein: 15 mg/dL / Nitrite: Negative   Leuk Esterase: Trace / RBC: 18 to 20 /HPF / WBC 3-5   Sq Epi: x / Non Sq Epi: x / Bacteria: x    Rapid Respiratory Viral Panel (18 @ 12:36)    Rapid RVP Result: Detected:     Entero/Rhinovirus (RapRVP): Detected: called to hair taylor md.18 15:41      Culture - Urine (collected 21 Sep 2018 12:36)  Source: .Urine None  Final Report (22 Sep 2018 17:54):    No growth    Culture - Blood (collected 21 Sep 2018 12:36)  Source: .Blood None  Preliminary Report (22 Sep 2018 17:02):    No growth to date.    Culture - Blood (collected 21 Sep 2018 12:36)  Source: .Blood None  Preliminary Report (22 Sep 2018 18:02):    No growth to date.          Radiology: all available radiological tests reviewed    < from: CT Abdomen and Pelvis w/ IV Cont (18 @ 14:04) >  No abscess identified.  Right lower lobe infiltrate  Small amount of free fluid in the pelvis  Hepatic cysts  Distended gallbladder    < end of copied text >      Advanced directives addressed: DNR/DNI

## 2018-09-23 NOTE — PROGRESS NOTE ADULT - SUBJECTIVE AND OBJECTIVE BOX
Subjective:    pat better, lying comfortably, temp 99.1.    Home Medications:  acetaminophen 325 mg oral tablet: 2 tab(s) orally 2 times a day (21 Sep 2018 18:02)  cefuroxime 250 mg oral tablet: 1 tab(s) orally every 12 hours      ***Course Completed*** (21 Sep 2018 18:02)  Colace 100 mg oral capsule: 1 cap(s) orally once a day (21 Sep 2018 18:02)  Cosopt PF 2%-0.5% ophthalmic solution: 1 drop(s) in the left eye 2 times a day (21 Sep 2018 18:02)  Depakote Sprinkles 125 mg oral delayed release capsule: 2 cap(s) orally 2 times a day (21 Sep 2018 18:02)  melatonin 3 mg oral tablet: 1 tab(s) orally once (at bedtime) (21 Sep 2018 18:02)  QUEtiapine 50 mg oral tablet: 1 tab(s) orally 2 times a day (21 Sep 2018 18:02)  Robitussin DM Sugar Free 10 mg-100 mg/5 mL oral liquid: 5 milliliter(s) orally 2 times a day       (21 Sep 2018 18:02)  senna 8.6 mg oral tablet: 2 tab(s) orally once a day (at bedtime) (21 Sep 2018 18:02)  tobramycin 0.3% ophthalmic solution: 1 drop(s) in each affected eye 3 times a day    ***Course Completed**** (21 Sep 2018 18:02)  Xanax 0.25 mg oral tablet: 1 tab(s) orally 3 times a day (21 Sep 2018 18:02)    MEDICATIONS  (STANDING):  ALPRAZolam 0.25 milliGRAM(s) Oral three times a day  diVALproex Oral Sprinkle Capsule - Peds 125 milliGRAM(s) Oral two times a day  dorzolamide 2%/timolol 0.5% Ophthalmic Solution 1 Drop(s) Both EYES two times a day  heparin  Injectable 5000 Unit(s) SubCutaneous every 12 hours  influenza   Vaccine 0.5 milliLiter(s) IntraMuscular once  melatonin 3 milliGRAM(s) Oral at bedtime  PARoxetine 10 milliGRAM(s) Oral daily  piperacillin/tazobactam IVPB. 3.375 Gram(s) IV Intermittent every 8 hours  potassium chloride   Powder 20 milliEquivalent(s) Oral once  QUEtiapine 100 milliGRAM(s) Oral at bedtime  QUEtiapine 25 milliGRAM(s) Oral daily  vancomycin  IVPB 750 milliGRAM(s) IV Intermittent every 12 hours    MEDICATIONS  (PRN):  acetaminophen  Suppository .. 650 milliGRAM(s) Rectal every 6 hours PRN Temp greater or equal to 38C (100.4F), Mild Pain (1 - 3)      Allergies    No Known Allergies    Intolerances        Vital Signs Last 24 Hrs  T(C): 37.5 (23 Sep 2018 14:28), Max: 37.8 (22 Sep 2018 17:47)  T(F): 99.5 (23 Sep 2018 14:28), Max: 100.1 (22 Sep 2018 17:47)  HR: 69 (23 Sep 2018 11:59) (62 - 86)  BP: 107/50 (23 Sep 2018 11:59) (84/49 - 137/63)  BP(mean): --  RR: 20 (23 Sep 2018 11:59) (18 - 20)  SpO2: 96% (23 Sep 2018 11:59) (96% - 100%)      PHYSICAL EXAMINATION:    NECK:  Supple. No lymphadenopathy. Jugular venous pressure not elevated. Carotids equal.   HEART:   The cardiac impulse has a normal quality. Reg., Nl S1 and S2.  There are no murmurs, rubs or gallops noted  CHEST:  Chest is clear to auscultation. Normal respiratory effort.  ABDOMEN:  Soft and nontender.   EXTREMITIES:  There is no edema.       LABS:                        9.5    11.98 )-----------( 175      ( 22 Sep 2018 04:47 )             29.2     09-22    141  |  108  |  11  ----------------------------<  96  3.5   |  25  |  0.40<L>    Ca    8.0<L>      22 Sep 2018 04:47    TPro  6.1  /  Alb  1.9<L>  /  TBili  0.6  /  DBili  x   /  AST  15  /  ALT  10<L>  /  AlkPhos  65  09-22

## 2018-09-24 LAB — VANCOMYCIN TROUGH SERPL-MCNC: 11.7 UG/ML — SIGNIFICANT CHANGE UP (ref 10–20)

## 2018-09-24 RX ORDER — POTASSIUM CHLORIDE 20 MEQ
20 PACKET (EA) ORAL ONCE
Qty: 0 | Refills: 0 | Status: COMPLETED | OUTPATIENT
Start: 2018-09-24 | End: 2018-09-24

## 2018-09-24 RX ADMIN — Medication 250 MILLIGRAM(S): at 05:42

## 2018-09-24 RX ADMIN — QUETIAPINE FUMARATE 25 MILLIGRAM(S): 200 TABLET, FILM COATED ORAL at 12:23

## 2018-09-24 RX ADMIN — DORZOLAMIDE HYDROCHLORIDE TIMOLOL MALEATE 1 DROP(S): 20; 5 SOLUTION/ DROPS OPHTHALMIC at 05:42

## 2018-09-24 RX ADMIN — DORZOLAMIDE HYDROCHLORIDE TIMOLOL MALEATE 1 DROP(S): 20; 5 SOLUTION/ DROPS OPHTHALMIC at 17:29

## 2018-09-24 RX ADMIN — PIPERACILLIN AND TAZOBACTAM 25 GRAM(S): 4; .5 INJECTION, POWDER, LYOPHILIZED, FOR SOLUTION INTRAVENOUS at 13:25

## 2018-09-24 RX ADMIN — DIVALPROEX SODIUM 125 MILLIGRAM(S): 500 TABLET, DELAYED RELEASE ORAL at 17:29

## 2018-09-24 RX ADMIN — Medication 10 MILLIGRAM(S): at 12:23

## 2018-09-24 RX ADMIN — Medication 0.25 MILLIGRAM(S): at 05:42

## 2018-09-24 RX ADMIN — PIPERACILLIN AND TAZOBACTAM 25 GRAM(S): 4; .5 INJECTION, POWDER, LYOPHILIZED, FOR SOLUTION INTRAVENOUS at 21:31

## 2018-09-24 RX ADMIN — Medication 650 MILLIGRAM(S): at 17:36

## 2018-09-24 RX ADMIN — Medication 20 MILLIEQUIVALENT(S): at 17:29

## 2018-09-24 RX ADMIN — PIPERACILLIN AND TAZOBACTAM 25 GRAM(S): 4; .5 INJECTION, POWDER, LYOPHILIZED, FOR SOLUTION INTRAVENOUS at 07:13

## 2018-09-24 RX ADMIN — QUETIAPINE FUMARATE 100 MILLIGRAM(S): 200 TABLET, FILM COATED ORAL at 22:24

## 2018-09-24 RX ADMIN — HEPARIN SODIUM 5000 UNIT(S): 5000 INJECTION INTRAVENOUS; SUBCUTANEOUS at 17:29

## 2018-09-24 RX ADMIN — Medication 0.25 MILLIGRAM(S): at 16:13

## 2018-09-24 RX ADMIN — Medication 0.25 MILLIGRAM(S): at 22:24

## 2018-09-24 RX ADMIN — HEPARIN SODIUM 5000 UNIT(S): 5000 INJECTION INTRAVENOUS; SUBCUTANEOUS at 05:42

## 2018-09-24 RX ADMIN — Medication 3 MILLIGRAM(S): at 22:24

## 2018-09-24 RX ADMIN — DIVALPROEX SODIUM 125 MILLIGRAM(S): 500 TABLET, DELAYED RELEASE ORAL at 05:42

## 2018-09-24 NOTE — GOALS OF CARE CONVERSATION - PERSONAL ADVANCE DIRECTIVE - WHAT MATTERS MOST
Pt being as comfortable as possible and having a chance at some quality of life. Daughter was clear that they are not interested in returning to hospital often because of known/anticipated further complications and certainly do not want any procedures at this point. They are happy that she is even somewhat improved and would like to have hospice services piggyback on this improvement once pt is returned to [facility].

## 2018-09-24 NOTE — SWALLOW BEDSIDE ASSESSMENT ADULT - PHARYNGEAL PHASE
Swallowing was triggered in an acceptable time frame for age.  Laryngeal lift on palpation during swallowing trials was felt to be grossly within age acceptable functional parameters. NO behavioral aspiration signs exhibited on exam. Odynophagia was denied.

## 2018-09-24 NOTE — SWALLOW BEDSIDE ASSESSMENT ADULT - ASR SWALLOW LINGUAL MOBILITY
Unable to formally assess given the severity of his cognitive dysfunction though it should be noted that no overt lip focality was evident during reflexive lingual movements Unable to formally assess given the severity of her cognitive dysfunction though it should be noted that no overt lingual focality was evident during reflexive tongue movements

## 2018-09-24 NOTE — SWALLOW BEDSIDE ASSESSMENT ADULT - ASR SWALLOW LABIAL MOBILITY
Unable to formally assess given the severity of his cognitive dysfunction though it should be noted that no overt lip focality was evident during reflexive labial movements. Unable to formally assess given the severity of her cognitive dysfunction though it should be noted that no overt lip focality was evident during reflexive labial movements.

## 2018-09-24 NOTE — SWALLOW BEDSIDE ASSESSMENT ADULT - ORAL PREPARATORY PHASE
Pt was confused, distractible and fidgety when PO was offered. Her willingness to accept PO was psychogenically reduced at times. When pt did accept PO, labial grading on utensils was felt to be grossly functional. Pt was confused, distractible and fidgety when PO was offered. Her willingness to accept PO was psychogenically reduced at times. When pt did accept PO, her oral aperture was reduced but labial grading on utensils was felt to be grossly functional.

## 2018-09-24 NOTE — SWALLOW BEDSIDE ASSESSMENT ADULT - SLP GENERAL OBSERVATIONS
On encounter, the pt was noted to be cachectic and a loss of bulk was evident in strap muscle regions. The pt was awake but distractible, fidgety, restless, oppositional and tearful at times. Unable to direct to structured communication tasks but pt did sometimes spontaneously verbalize. At these times, her verbalizations were functional from a motor speech perspective and her utterances were linguistically intact. However, her verbalizations were brief, fragmented, tangential and typically contextually inappropriate c/w baseline severe Cognitive Dysfunction associated with dementia. Communicative integrity likely at or close to usual state.

## 2018-09-24 NOTE — GOALS OF CARE CONVERSATION - PERSONAL ADVANCE DIRECTIVE - TREATMENT GUIDELINE COMMENT
MOLST decisions: DNR, comfort measures, DNI, do not send to hospital, no feeding tube, trial of IVF ok, determine use of abx, no dialysis, no transfusions

## 2018-09-24 NOTE — SWALLOW BEDSIDE ASSESSMENT ADULT - ADDITIONAL RECOMMENDATIONS
1) Nutrition follow up. Profile places pt at nutrition risk. Note that I do not feel that placing a feeding tube would enhance her life quality giventhe advancement of her dementia.    2) Consider potential benefit of a Palliative Care consult. 1) Nutrition follow up. Profile places pt at nutrition risk. Note that I do not feel that placing a feeding tube would enhance her life quality given the advancement of her dementia.    2) Consider potential benefit of a Palliative Care consult.

## 2018-09-24 NOTE — CONSULT NOTE ADULT - ASSESSMENT
Spent 32 minutes discussing GOC with family including Advance care planning, explanation and discussion of advance directives, reviewed MOLST and finalized DNR/DNI form. See GOC note for further details.73y old Female coming from [facility] with hx of Dementia (dx 2016, progressive, dementia unit, 12hr aid), OCD, anxiety, glaucoma, recently admitted 8/1-8/8 for UTI, with ED visit s/p fall, now readmitted 9/21 for fever, cough and congestion. Found to have CT showing RLL pna, URI +rhinovirus, and also with agitation at times. Palliative Care consulted to assist with establishing GOC.     1) AMS  - hx of dementia likely c/b delirium  - psych notes appreciated  - c/w seroquel and xanax as noted by psych  - frequent reorientation  - fall and aspiration precautions    2) PNA  - RLL pna seen on imaging  - pulm notes appreciated  - seems to be improving with IV abx    3) Debility  - PPS<40%  - 12hr aid in memory care unit, needs assistance for ADLs  - evidence of inability to maintain adequate intake via muscle/fat wasting    4) Prognosis  - poor  - in light of advanced dementia, FAST 7, few words spoken, dependence for all ADLs, recurrent UTIs, albumin 1.9, muscle/fat wasting, pt fits criteria for hospice.     5) GOC/Advanced Directives  - pt does not have capacity for decision making  - HCP on chart naming children : 1) Donna Ogden; 2) Everardo Diehl  - DNR and DNI, signed  - MOLST completed: DNR, comfort measures, DNI, do not send to hospital, no feeding tube, trial of IVF ok, determine use of abx, no dialysis, no transfusions  - GOC meeting held today- family wanting to complete infectious treatment here and then are open to home hospice referral if ok with [facility] (SW checked and they are ok with this), thus referral being sent for [facility]. *Spent 40 minutes discussing GOC with family including Advance care planning, explanation and discussion of advance directives, reviewed all dispo options and MOLST and finalized DNR/DNI form. See GOC note for further details.    Thank you for including us in Ms. Diehl's care. Will continue to follow with you.    Ledy Garcia MD  Palliative Care Attending 73y old Female coming from [facility] with hx of Dementia (dx 2016, progressive, dementia unit, 12hr aid), OCD, anxiety, glaucoma, recently admitted 8/1-8/8 for UTI, with ED visit s/p fall, now readmitted 9/21 for fever, cough and congestion. Found to have CT showing RLL pna, URI +rhinovirus, and also with agitation at times. Palliative Care consulted to assist with establishing GOC.     1) AMS  - hx of dementia likely c/b delirium  - psych notes appreciated  - c/w seroquel and xanax as noted by psych  - frequent reorientation  - fall and aspiration precautions    2) PNA  - RLL pna seen on imaging  - pulm notes appreciated  - seems to be improving with IV abx    3) Debility  - PPS<40%  - 12hr aid in memory care unit, needs assistance for ADLs  - evidence of inability to maintain adequate intake via muscle/fat wasting    4) Prognosis  - poor  - in light of advanced dementia, FAST 7, few words spoken, dependence for all ADLs, recurrent UTIs, albumin 1.9, muscle/fat wasting, pt fits criteria for hospice.     5) GOC/Advanced Directives  - pt does not have capacity for decision making  - HCP on chart naming children : 1) Donna Ogden; 2) Everardo Diehl  - DNR and DNI, signed  - MOLST completed: DNR, comfort measures, DNI, do not send to hospital, no feeding tube, trial of IVF ok, determine use of abx, no dialysis, no transfusions  - GOC meeting held today- family wanting to complete infectious treatment here and then are open to home hospice referral if ok with [facility] (SW checked and they are ok with this), thus referral being sent for [facility]. *Spent 40 minutes discussing GOC with family including Advance care planning, explanation and discussion of advance directives, reviewed all dispo options and MOLST and finalized DNR/DNI form. See GOC note for further details.    Thank you for including us in Ms. Diehl's care. Will continue to follow with you.    Ledy Garcia MD  Palliative Care Attending

## 2018-09-24 NOTE — PROGRESS NOTE ADULT - SUBJECTIVE AND OBJECTIVE BOX
Subjective:    pat lying comfortably, no respiratory distress.    Home Medications:  acetaminophen 325 mg oral tablet: 2 tab(s) orally 2 times a day (21 Sep 2018 18:02)  cefuroxime 250 mg oral tablet: 1 tab(s) orally every 12 hours      ***Course Completed*** (21 Sep 2018 18:02)  Colace 100 mg oral capsule: 1 cap(s) orally once a day (21 Sep 2018 18:02)  Cosopt PF 2%-0.5% ophthalmic solution: 1 drop(s) in the left eye 2 times a day (21 Sep 2018 18:02)  Depakote Sprinkles 125 mg oral delayed release capsule: 2 cap(s) orally 2 times a day (21 Sep 2018 18:02)  melatonin 3 mg oral tablet: 1 tab(s) orally once (at bedtime) (21 Sep 2018 18:02)  QUEtiapine 50 mg oral tablet: 1 tab(s) orally 2 times a day (21 Sep 2018 18:02)  Robitussin DM Sugar Free 10 mg-100 mg/5 mL oral liquid: 5 milliliter(s) orally 2 times a day       (21 Sep 2018 18:02)  senna 8.6 mg oral tablet: 2 tab(s) orally once a day (at bedtime) (21 Sep 2018 18:02)  tobramycin 0.3% ophthalmic solution: 1 drop(s) in each affected eye 3 times a day    ***Course Completed**** (21 Sep 2018 18:02)  Xanax 0.25 mg oral tablet: 1 tab(s) orally 3 times a day (21 Sep 2018 18:02)    MEDICATIONS  (STANDING):  ALPRAZolam 0.25 milliGRAM(s) Oral three times a day  diVALproex Oral Sprinkle Capsule - Peds 125 milliGRAM(s) Oral two times a day  dorzolamide 2%/timolol 0.5% Ophthalmic Solution 1 Drop(s) Both EYES two times a day  heparin  Injectable 5000 Unit(s) SubCutaneous every 12 hours  influenza   Vaccine 0.5 milliLiter(s) IntraMuscular once  melatonin 3 milliGRAM(s) Oral at bedtime  PARoxetine 10 milliGRAM(s) Oral daily  piperacillin/tazobactam IVPB. 3.375 Gram(s) IV Intermittent every 8 hours  QUEtiapine 100 milliGRAM(s) Oral at bedtime  QUEtiapine 25 milliGRAM(s) Oral daily  vancomycin  IVPB 750 milliGRAM(s) IV Intermittent every 12 hours    MEDICATIONS  (PRN):  acetaminophen  Suppository .. 650 milliGRAM(s) Rectal every 6 hours PRN Temp greater or equal to 38C (100.4F), Mild Pain (1 - 3)      Allergies    No Known Allergies    Intolerances        Vital Signs Last 24 Hrs  T(C): 36.4 (24 Sep 2018 05:18), Max: 37.5 (23 Sep 2018 14:28)  T(F): 97.6 (24 Sep 2018 05:18), Max: 99.5 (23 Sep 2018 14:28)  HR: 67 (24 Sep 2018 05:18) (67 - 73)  BP: 130/63 (24 Sep 2018 05:18) (106/53 - 130/63)  BP(mean): --  RR: 18 (24 Sep 2018 05:18) (18 - 20)  SpO2: 100% (24 Sep 2018 05:18) (96% - 100%)      PHYSICAL EXAMINATION:    NECK:  Supple. No lymphadenopathy. Jugular venous pressure not elevated. Carotids equal.   HEART:   The cardiac impulse has a normal quality. Reg., Nl S1 and S2.  There are no murmurs, rubs or gallops noted  CHEST:  Chest crackles to auscultation. Normal respiratory effort.  ABDOMEN:  Soft and nontender.   EXTREMITIES:  There is no edema.       LABS:

## 2018-09-24 NOTE — PROGRESS NOTE ADULT - SUBJECTIVE AND OBJECTIVE BOX
74 y/o WF with pmhx of Dementia, OCD / Anxiety, Glaucoma admitted from [facility] facility with noted temp of 102,cough and congestion. Patient is extremely poor historian due to underlying dementia. As per family, they have not noted any cough, but they indicate that several of the [facility] patients were recently sick and one of them was recently discharged from [facility].  As per family, pt is at baseline neurologically.  . Full HPI unobtainable due to patient being nonverbal and agitated     9.23: non verbal, not follows commands   9.24: awake, more alert, anxious, no dyspnea        REVIEW OF SYSTEMS:    CONSTITUTIONAL: No weakness, No fevers or chills  ENT: No ear ache, No sorethroat  NECK: No pain, No stiffness  RESPIRATORY: No cough, No wheezing, No hemoptysis; No dyspnea  CARDIOVASCULAR: No chest pain, No palpitations  GASTROINTESTINAL: No abd pain, No nausea, No vomiting, No hematemesis, No diarrhea or constipation. No melena, No hematochezia.  GENITOURINARY: No dysuria, No  hematuria  NEUROLOGICAL: No diplopia, No paresthesia, No motor dysfunction  MUSCULOSKELETAL: No arthralgia, No myalgia  SKIN: No rashes, or lesions   PSYCH: no anxiety, no suicidal ideation    All other review of systems is negative unless indicated above    Vital Signs Last 24 Hrs  T(C): 36.5 (24 Sep 2018 12:07), Max: 37.5 (23 Sep 2018 14:28)  T(F): 97.7 (24 Sep 2018 12:07), Max: 99.5 (23 Sep 2018 14:28)  HR: 64 (24 Sep 2018 12:07) (64 - 73)  BP: 116/54 (24 Sep 2018 12:07) (106/53 - 130/63)  RR: 16 (24 Sep 2018 12:07) (16 - 18)  SpO2: 100% (24 Sep 2018 12:07) (99% - 100%)    PHYSICAL EXAM:    GENERAL: NAD, Well nourished  HEENT:  NC/AT, EOMI, PERRLA, No scleral icterus, Moist mucous membranes  NECK: Supple, No JVD  CNS:  Alert & Oriented X3, Motor Strength 5/5 B/L upper and lower extremities; DTRs 2+ intact   LUNG: Normal Breath sounds, Clear to auscultation bilaterally, No rales, No rhonchi, No wheezing  HEART: RRR; No murmurs, No rubs  ABDOMEN: +BS, ST/ND/NT  GENITOURINARY: Voiding, Bladder not distended  EXTREMITIES:  2+ Peripheral Pulses, No clubbing, No cyanosis, No tibial edema  MUSCULOSKELTAL: Joints normal ROM, No TTP, No effusion  VAGINAL: deferred  SKIN: no rashes  RECTAL: deferred, not indicated  BREAST: deferred             Vancomycin Level, Trough: 11.7 ug/mL (09-24 @ 05:41)      MEDICATIONS  (STANDING):  ALPRAZolam 0.25 milliGRAM(s) Oral three times a day  diVALproex Oral Sprinkle Capsule - Peds 125 milliGRAM(s) Oral two times a day  dorzolamide 2%/timolol 0.5% Ophthalmic Solution 1 Drop(s) Both EYES two times a day  heparin  Injectable 5000 Unit(s) SubCutaneous every 12 hours  influenza   Vaccine 0.5 milliLiter(s) IntraMuscular once  melatonin 3 milliGRAM(s) Oral at bedtime  PARoxetine 10 milliGRAM(s) Oral daily  piperacillin/tazobactam IVPB. 3.375 Gram(s) IV Intermittent every 8 hours  QUEtiapine 100 milliGRAM(s) Oral at bedtime  QUEtiapine 25 milliGRAM(s) Oral daily  sodium chloride 0.9%. 250 milliLiter(s) (250 mL/Hr) IV Continuous <Continuous>  vancomycin  IVPB 750 milliGRAM(s) IV Intermittent every 12 hours    MEDICATIONS  (PRN):  acetaminophen  Suppository .. 650 milliGRAM(s) Rectal every 6 hours PRN Temp greater or equal to 38C (100.4F), Mild Pain (1 - 3)      all labs reviewed  all imaging reviewed    < from: CT Abdomen and Pelvis w/ IV Cont (09.21.18 @ 14:04) >  IMPRESSION:  No abscess identified.  Right lower lobe infiltrate  Small amount of free fluid in the pelvis  Hepatic cysts  Distended gallbladder      Assessment and Plan:    1. RLL Pna suspect GNR bacteria:  Cefepime/Vancomycin day#4/5  Blood Cx negative  Bronchodilators prn    2. Dementia:  stable    3. r/o Dysphagia: Swallow evaluation  PT 74 y/o WF with pmhx of Dementia, OCD / Anxiety, Glaucoma admitted from [facility] facility with noted temp of 102,cough and congestion. Patient is extremely poor historian due to underlying dementia. As per family, they have not noted any cough, but they indicate that several of the [facility] patients were recently sick and one of them was recently discharged from [facility].  As per family, pt is at baseline neurologically.  . Full HPI unobtainable due to patient being nonverbal and agitated     9.23: non verbal, not follows commands   9.24: awake, more alert, anxious, no dyspnea        REVIEW OF SYSTEMS:    CONSTITUTIONAL: No weakness, No fevers or chills  ENT: No ear ache, No sorethroat  NECK: No pain, No stiffness  RESPIRATORY: No cough, No wheezing, No hemoptysis; No dyspnea  CARDIOVASCULAR: No chest pain, No palpitations  GASTROINTESTINAL: No abd pain, No nausea, No vomiting, No hematemesis, No diarrhea or constipation. No melena, No hematochezia.  GENITOURINARY: No dysuria, No  hematuria  NEUROLOGICAL: No diplopia, No paresthesia, No motor dysfunction  MUSCULOSKELETAL: No arthralgia, No myalgia  SKIN: No rashes, or lesions   PSYCH: no anxiety, no suicidal ideation    All other review of systems is negative unless indicated above    Vital Signs Last 24 Hrs  T(C): 36.5 (24 Sep 2018 12:07), Max: 37.5 (23 Sep 2018 14:28)  T(F): 97.7 (24 Sep 2018 12:07), Max: 99.5 (23 Sep 2018 14:28)  HR: 64 (24 Sep 2018 12:07) (64 - 73)  BP: 116/54 (24 Sep 2018 12:07) (106/53 - 130/63)  RR: 16 (24 Sep 2018 12:07) (16 - 18)  SpO2: 100% (24 Sep 2018 12:07) (99% - 100%)    PHYSICAL EXAM:    GENERAL: NAD, Well nourished  HEENT:  NC/AT, EOMI, PERRLA, No scleral icterus, Moist mucous membranes  NECK: Supple, No JVD  CNS:  Alert & Oriented X3, Motor Strength 5/5 B/L upper and lower extremities; DTRs 2+ intact   LUNG: Normal Breath sounds, Clear to auscultation bilaterally, No rales, No rhonchi, No wheezing  HEART: RRR; No murmurs, No rubs  ABDOMEN: +BS, ST/ND/NT  GENITOURINARY: Voiding, Bladder not distended  EXTREMITIES:  2+ Peripheral Pulses, No clubbing, No cyanosis, No tibial edema  MUSCULOSKELTAL: Joints normal ROM, No TTP, No effusion  VAGINAL: deferred  SKIN: no rashes  RECTAL: deferred, not indicated  BREAST: deferred             Vancomycin Level, Trough: 11.7 ug/mL (09-24 @ 05:41)      MEDICATIONS  (STANDING):  ALPRAZolam 0.25 milliGRAM(s) Oral three times a day  diVALproex Oral Sprinkle Capsule - Peds 125 milliGRAM(s) Oral two times a day  dorzolamide 2%/timolol 0.5% Ophthalmic Solution 1 Drop(s) Both EYES two times a day  heparin  Injectable 5000 Unit(s) SubCutaneous every 12 hours  influenza   Vaccine 0.5 milliLiter(s) IntraMuscular once  melatonin 3 milliGRAM(s) Oral at bedtime  PARoxetine 10 milliGRAM(s) Oral daily  piperacillin/tazobactam IVPB. 3.375 Gram(s) IV Intermittent every 8 hours  QUEtiapine 100 milliGRAM(s) Oral at bedtime  QUEtiapine 25 milliGRAM(s) Oral daily  sodium chloride 0.9%. 250 milliLiter(s) (250 mL/Hr) IV Continuous <Continuous>  vancomycin  IVPB 750 milliGRAM(s) IV Intermittent every 12 hours    MEDICATIONS  (PRN):  acetaminophen  Suppository .. 650 milliGRAM(s) Rectal every 6 hours PRN Temp greater or equal to 38C (100.4F), Mild Pain (1 - 3)      all labs reviewed  all imaging reviewed    < from: CT Abdomen and Pelvis w/ IV Cont (09.21.18 @ 14:04) >  IMPRESSION:  No abscess identified.  Right lower lobe infiltrate  Small amount of free fluid in the pelvis  Hepatic cysts  Distended gallbladder      Assessment and Plan:    1. RLL Pna suspect GNR bacteria:  +Enterovirus URI  Cefepime/Vancomycin day#4/5  Blood Cx negative  Bronchodilators prn    2. Dementia:  stable    3. r/o Dysphagia: Swallow evaluation  PT

## 2018-09-24 NOTE — SWALLOW BEDSIDE ASSESSMENT ADULT - ORAL PHASE
Bolus formation and transfer were grossly within mechanically functional parameters. The pt was able to clear oral debris on own after age acceptable piecemeal deglutition.

## 2018-09-24 NOTE — SWALLOW BEDSIDE ASSESSMENT ADULT - SWALLOW EVAL: CRITERIA FOR SKILLED INTERVENTION MET
ACUTE SPEECH PATHOLOGY INTERVENTION ATTEMPTS WOULD NOT . PT'S REDUCED FEEDING ORIENTATION AND COGNITIVE DYSFUNCTION ARE CHRONIC/PRE-EXISTING/IRREVERSIBLE. HER COMMUNICATIVE AND OROPHARYNGEAL SWALLOWING POTENTIAL ARE MAXIMIZED. PT WOULD NOT BE A VIABLE THERAPY CANDIDATE NONETHELESS GIVEN THE SEVERITY OF HER ALTERED MENTATION/COGNITIVE DEFICITS FROM DEMENTIA. GIVEN ABOVE, WILL NOT ACTIVELY FOLLOW. RECONSULT PRN AS NEEDED.

## 2018-09-24 NOTE — SWALLOW BEDSIDE ASSESSMENT ADULT - COMMENTS
The patient was admitted to  from [facility] with fever, cough and congestion. Hospital course is notable for sepsis, UTI, Enterovirus, PNA and cough/congestion. This profile is superimposed upon a history of advanced dementia, anxiety, OCD and glaucoma. See below for additional prior medical information.

## 2018-09-24 NOTE — SWALLOW BEDSIDE ASSESSMENT ADULT - SWALLOW EVAL: FEEDING ASSISTANCE
Assistance is needed to feed given the severity of her cognitive deficits. Pt also needs assistance to set up her tray and cut her food.

## 2018-09-24 NOTE — GOALS OF CARE CONVERSATION - PERSONAL ADVANCE DIRECTIVE - CONVERSATION DETAILS
Met with pt's daughter/HCP Donna to discuss medical issues and overall GOc. She explained that pt has been dx with dementia in 2016 with progressive decline since then. She initially lived with her  who is mentally clear but became physically unwell and refused to have help initially. At one point they were living alone with neighbors complaining about issues stemming from their lack of hygiene, which was very saddening to children. Family was eventually allowed to step in, at first with aides, but due to pt's increasing combativeness, [facility]. The pt and her  are doing well there, the pt is on the memory care unit because of her extra need for help, including a 12hr aid who assists with all ADLs. Daughter explained that pt is incontinent, walks with assistance but usually in a [place] and crying due to emotional disturbance from dementia, which clinicians have told them will unfortunately likely only worsen. Thus, while saddened by how quickly pt has declined, they are not surprised by where she is now. She notes she and family have had many discussions about where things are going and are open to help figuring this out.     Daughter explained that aside from dementia progression pt has also had multiple UTIs recently, hospitalizations for this and also fall where she hit her head. Reviewed current medical issues including pneumonia and how these infections are unfortunately par for the course with advanced dementia patients. We also discussed the staging of this illness and clarified that it seemed that pt was nearing end stages, reaching a FAST7 with her current need for assistance and due to only saying few words. She confirmed this, saying it was rare for pt to say something meaningful, and how she was near tears yesterday when pt told her that she loved her. At this point, family is hoping for comfort and some means of QOL, which they fear she may not have due to her emotional/psychological state. Spent time reassuring daughter that they have been making all the right decisions for their mom and how clear it is that they care for her. We reviewed her current dispo plan and family's desire to complete MOLST form in effort to clarify this for clinicians. However, noted that due to above and their hopes perhaps hospice would be a better option.     Spent time discussing hospice option and intent to have floor SW check in with [facility] to see if they would accommodate this. [facility] did so and [facility] was willing, thus family was on board with sending [facility] home hospice referral for when medical team deems pt stable for dc back to senior living. We filled out a comfort MOLST form to reflect this focus of care and also confirmed with daughter pt's aforementioned wishes to never be on any machines/prolonged artificially. See below for detailed list of MOLST choices.     Ultimately, plan is for pt to return to [facility] with [facility] home hospice services once deemed medically stable by primary team. We will follow for sx management and support in the interim. Floor SW and SHIRA Alvarez made aware of above.

## 2018-09-24 NOTE — CONSULT NOTE ADULT - SUBJECTIVE AND OBJECTIVE BOX
HPI:    73 y.o.f with pmhx of dementia, OCD / Anxiety, glaucoma admitted from [facility] facility with noted temp of 102,cough and congestion. Patient is underlying dementia.History from daughter several of the [facility] patients were recently sick and one of them was recently discharged from [facility].  pat with RLL pneumonia & positive rhino, ask to see for pulmonary.    PAST MEDICAL & SURGICAL HISTORY:  Dementia  Anxiety  Canaloplasty Left eye  History of Tonsillectomy  Hernia: inguinal      Home Medications:  acetaminophen 325 mg oral tablet: 2 tab(s) orally 2 times a day (21 Sep 2018 18:02)  cefuroxime 250 mg oral tablet: 1 tab(s) orally every 12 hours      ***Course Completed*** (21 Sep 2018 18:02)  Colace 100 mg oral capsule: 1 cap(s) orally once a day (21 Sep 2018 18:02)  Cosopt PF 2%-0.5% ophthalmic solution: 1 drop(s) in the left eye 2 times a day (21 Sep 2018 18:02)  Depakote Sprinkles 125 mg oral delayed release capsule: 2 cap(s) orally 2 times a day (21 Sep 2018 18:02)  melatonin 3 mg oral tablet: 1 tab(s) orally once (at bedtime) (21 Sep 2018 18:02)  QUEtiapine 50 mg oral tablet: 1 tab(s) orally 2 times a day (21 Sep 2018 18:02)  Robitussin DM Sugar Free 10 mg-100 mg/5 mL oral liquid: 5 milliliter(s) orally 2 times a day       (21 Sep 2018 18:02)  senna 8.6 mg oral tablet: 2 tab(s) orally once a day (at bedtime) (21 Sep 2018 18:02)  tobramycin 0.3% ophthalmic solution: 1 drop(s) in each affected eye 3 times a day    ***Course Completed**** (21 Sep 2018 18:)  Xanax 0.25 mg oral tablet: 1 tab(s) orally 3 times a day (21 Sep 2018 18:02)      MEDICATIONS  (STANDING):  ALPRAZolam 0.25 milliGRAM(s) Oral three times a day  diVALproex Oral Sprinkle Capsule - Peds 125 milliGRAM(s) Oral two times a day  dorzolamide 2%/timolol 0.5% Ophthalmic Solution 1 Drop(s) Both EYES two times a day  influenza   Vaccine 0.5 milliLiter(s) IntraMuscular once  melatonin 3 milliGRAM(s) Oral at bedtime  PARoxetine 10 milliGRAM(s) Oral daily  piperacillin/tazobactam IVPB. 3.375 Gram(s) IV Intermittent every 8 hours  QUEtiapine 100 milliGRAM(s) Oral at bedtime  QUEtiapine 25 milliGRAM(s) Oral daily  vancomycin  IVPB 750 milliGRAM(s) IV Intermittent every 12 hours    MEDICATIONS  (PRN):  acetaminophen  Suppository .. 650 milliGRAM(s) Rectal every 6 hours PRN Temp greater or equal to 38C (100.4F), Mild Pain (1 - 3)      Allergies    No Known Allergies    Intolerances        SOCIAL HISTORY: Denies tobacco, etoh abuse or illicit drug use    FAMILY HISTORY:  No pertinent family history in first degree relatives      Vital Signs Last 24 Hrs  T(C): 38.4 (22 Sep 2018 02:51), Max: 38.4 (22 Sep 2018 02:51)  T(F): 101.2 (22 Sep 2018 02:51), Max: 101.2 (22 Sep 2018 02:51)  HR: 71 (21 Sep 2018 19:32) (71 - 83)  BP: 148/53 (22 Sep 2018 02:51) (106/55 - 148/53)  BP(mean): --  RR: 18 (21 Sep 2018 19:32) (13 - 18)  SpO2: 98% (21 Sep 2018 19:32) (96% - 98%)        REVIEW OF SYSTEMS:    CONSTITUTIONAL:  As per HPI. poor historian     PHYSICAL EXAMINATION:    GENERAL APPEARANCE:  Pt. is not currently dyspneic, in no distress. Pt. is alert, oriented, and pleasant.  HEENT:  Pupils are normal and react normally. No icterus. Mucous membranes well colored.  NECK:  Supple. No lymphadenopathy. Jugular venous pressure not elevated. Carotids equal.   HEART:   The cardiac impulse has a normal quality. Regular. Normal S1 and S2. There are no murmurs, rubs or gallops noted  CHEST:  Chest crackles to auscultation. Normal respiratory effort.  ABDOMEN:  Soft and nontender.   EXTREMITIES:  There is no cyanosis, clubbing or edema.   SKIN:  No rash or significant lesions are noted.    LABS:                        9.5    11.98 )-----------( 175      ( 22 Sep 2018 04:47 )             29.2         141  |  108  |  11  ----------------------------<  96  3.5   |  25  |  0.40<L>    Ca    8.0<L>      22 Sep 2018 04:47    TPro  6.1  /  Alb  1.9<L>  /  TBili  0.6  /  DBili  x   /  AST  15  /  ALT  10<L>  /  AlkPhos  65      LIVER FUNCTIONS - ( 22 Sep 2018 04:47 )  Alb: 1.9 g/dL / Pro: 6.1 gm/dL / ALK PHOS: 65 U/L / ALT: 10 U/L / AST: 15 U/L / GGT: x           PT/INR - ( 21 Sep 2018 12:36 )   PT: 13.6 sec;   INR: 1.25 ratio         PTT - ( 21 Sep 2018 12:36 )  PTT:27.4 sec      Urinalysis Basic - ( 21 Sep 2018 12:36 )    Color: Yellow / Appearance: Clear / S.010 / pH: x  Gluc: x / Ketone: Moderate  / Bili: Negative / Urobili: 1 mg/dL   Blood: x / Protein: 15 mg/dL / Nitrite: Negative   Leuk Esterase: Trace / RBC: 18 to 20 /HPF / WBC 3-5   Sq Epi: x / Non Sq Epi: x / Bacteria: x    RADIOLOGY & ADDITIONAL STUDIES:     CT Abdomen and Pelvis w/ IV Cont (18 @ 14:04) >  IMPRESSION:  No abscess identified.  Right lower lobe infiltrate  Small amount of free fluid in the pelvis  Hepatic cysts  Distended gallbladder
HPI: Ms. Diehl is a 73y old Female coming from [facility] with hx of Dementia (dx 2016, progressive, dementia unit, 12hr aid), OCD, anxiety, glaucoma, recently admitted 8/1-8/8 for UTI, with ED visit s/p fall, now readmitted 9/21 for fever, cough and congestion. Found to have CT showing RLL pna, URI +rhinovirus, and also with agitation at times. Palliative Care consulted to assist with establishing GOC.     Met Ms. Diehl this am, daughters and  at bedside. Pt confused with few words, not answering questions or following commands. Appears comfortable, allowing exam. This is baseline mentation as per family, sometimes does not recognize them and says few words. Pt unable to contribute to HPI. Met separately with daughter Donna Ogden (HCP1) for full GOC conversation. See GOC note for additional details.       PAIN: ( )Yes   (x )No- no nonverbal signs of pain    DYSPNEA: ( ) Yes  ( x) No- no nonverbal signs of dyspnea  Level:    PAST MEDICAL & SURGICAL HISTORY:  Dementia  Anxiety  Canaloplasty Left eye  History of Tonsillectomy  Hernia: inguinal      SOCIAL HX: , 3 children    Hx opiate tolerance ( )YES  (x )NO    Baseline ADLs  (Prior to Admission)- dependent for all ADLs as per family, incontinent of urine and stool, walks around with help but cries often and at times is combative  ( ) Independent   (x )Dependent    FAMILY HISTORY:  Unable to obtain 2/2 to dementia      Review of Systems:  Unable to obtain due to dementia      PHYSICAL EXAM:    Vital Signs Last 24 Hrs  T(C): 36.4 (24 Sep 2018 05:18), Max: 37.5 (23 Sep 2018 14:28)  T(F): 97.6 (24 Sep 2018 05:18), Max: 99.5 (23 Sep 2018 14:28)  HR: 67 (24 Sep 2018 05:18) (67 - 73)  BP: 130/63 (24 Sep 2018 05:18) (106/53 - 130/63)  BP(mean): --  RR: 18 (24 Sep 2018 05:18) (18 - 18)  SpO2: 100% (24 Sep 2018 05:18) (99% - 100%)  Daily     Daily     PPSV2: 20-30  %  FAST: 7a    General: Older female lying in bed, thin, not answering questions or following commands, awake, NAD  Mental Status: Aox0  HEENT: dmm, perrl, +temporal and clavicular wasting  Lungs: dec at bases bl  Cardiac: +s1 s2 rrr  GI: soft nt nd +bs  : incontinent  Ext: moves to touch, muscle and fat wasting throughout  Neuro: limited by mentation, moves to touch but does not follow commands      LABS: Complete Blood Count (09.22.18 @ 04:47)    Nucleated RBC: 0 /100 WBCs    WBC Count: 11.98 K/uL    RBC Count: 3.01 M/uL    Hemoglobin: 9.5 g/dL    Hematocrit: 29.2 %    Mean Cell Volume: 97.0 fl    Mean Cell Hemoglobin: 31.6 pg    Mean Cell Hemoglobin Conc: 32.5 gm/dL    Red Cell Distrib Width: 12.3 %    Platelet Count - Automated: 175 K/uL    Comprehensive Metabolic Panel (09.22.18 @ 04:47)    Sodium, Serum: 141 mmol/L    Potassium, Serum: 3.5 mmol/L    Chloride, Serum: 108 mmol/L    Carbon Dioxide, Serum: 25 mmol/L    Anion Gap, Serum: 8 mmol/L    Blood Urea Nitrogen, Serum: 11 mg/dL    Creatinine, Serum: 0.40 mg/dL    Glucose, Serum: 96 mg/dL    Calcium, Total Serum: 8.0 mg/dL    Protein Total, Serum: 6.1 gm/dL    Albumin, Serum: 1.9 g/dL    Bilirubin Total, Serum: 0.6 mg/dL    Alkaline Phosphatase, Serum: 65 U/L    Aspartate Aminotransferase (AST/SGOT): 15 U/L    Alanine Aminotransferase (ALT/SGPT): 10 U/L    Albumin: Albumin, Serum: 1.9 g/dL (09-22 @ 04:47)      Allergies  No Known Allergies    Intolerances      MEDICATIONS  (STANDING):  ALPRAZolam 0.25 milliGRAM(s) Oral three times a day  diVALproex Oral Sprinkle Capsule - Peds 125 milliGRAM(s) Oral two times a day  dorzolamide 2%/timolol 0.5% Ophthalmic Solution 1 Drop(s) Both EYES two times a day  heparin  Injectable 5000 Unit(s) SubCutaneous every 12 hours  influenza   Vaccine 0.5 milliLiter(s) IntraMuscular once  melatonin 3 milliGRAM(s) Oral at bedtime  PARoxetine 10 milliGRAM(s) Oral daily  piperacillin/tazobactam IVPB. 3.375 Gram(s) IV Intermittent every 8 hours  QUEtiapine 100 milliGRAM(s) Oral at bedtime  QUEtiapine 25 milliGRAM(s) Oral daily  vancomycin  IVPB 750 milliGRAM(s) IV Intermittent every 12 hours    MEDICATIONS  (PRN):  acetaminophen  Suppository .. 650 milliGRAM(s) Rectal every 6 hours PRN Temp greater or equal to 38C (100.4F), Mild Pain (1 - 3)      RADIOLOGY/ADDITIONAL STUDIES:    EXAM:  CT ABDOMEN AND PELVIS IC                        PROCEDURE DATE:  09/21/2018      IMPRESSION:  No abscess identified.  Right lower lobe infiltrate  Small amount of free fluid in the pelvis  Hepatic cysts  Distended gallbladder      STU PEÑALOZA M.D.,ATTENDING RADIOLOGIST  This document has been electronically signed. Sep 21 2018  2:40PM      EXAM:  XR CHEST PORTABLE IMMED 1V                        PROCEDURE DATE:  09/21/2018        INTERPRETATION:  History: Fever sepsis    Chest:  one view.      Comparison: 8/1/2018    AP radiograph of the chest demonstrates faint infiltrate in the RIGHT   lower lobe. The cardiac silhouette is normal in size. Osseous structures   are intact. Loose body seen in the LEFT shoulder joint.    Impression: Faint infiltrate in RIGHT lower lobe.      MADALYN COSME M.D., ATTENDING RADIOLOGIST  This document has been electronically signed. Sep 21 2018  2:42PM
Patient is a 73y old  Female who presents with a chief complaint of SOB  HPI:  74 y/o female with h/o dementia, OCD, anxiety disorder, glaucoma was admitted on  from [facility] facility for fever to 102F, cough and congestion. Patient is extremely poor historian due to underlying dementia. The patient is nonverbal and restless. In ER, she received zosyn and vancomycin IV.    PMH: as above  PSH: as above  Meds: per reconciliation sheet, noted below  MEDICATIONS  (STANDING):  ALPRAZolam 0.25 milliGRAM(s) Oral three times a day  diVALproex Oral Sprinkle Capsule - Peds 125 milliGRAM(s) Oral two times a day  dorzolamide 2%/timolol 0.5% Ophthalmic Solution 1 Drop(s) Both EYES two times a day  influenza   Vaccine 0.5 milliLiter(s) IntraMuscular once  melatonin 3 milliGRAM(s) Oral at bedtime  PARoxetine 10 milliGRAM(s) Oral daily  piperacillin/tazobactam IVPB. 3.375 Gram(s) IV Intermittent every 8 hours  QUEtiapine 100 milliGRAM(s) Oral at bedtime  QUEtiapine 25 milliGRAM(s) Oral daily  vancomycin  IVPB 1000 milliGRAM(s) IV Intermittent every 12 hours    MEDICATIONS  (PRN):  acetaminophen  Suppository .. 650 milliGRAM(s) Rectal every 6 hours PRN Temp greater or equal to 38C (100.4F), Mild Pain (1 - 3)    Allergies    No Known Allergies    Intolerances      Social: no smoking, no alcohol, no illegal drugs; no recent travel, no exposure to TB  FAMILY HISTORY:  No pertinent family history in first degree relatives    no history of premature cardiovascular disease in first degree relatives    ROS: the patient is confused; unobtainable  All other systems reviewed and are negative    Vital Signs Last 24 Hrs  T(C): 38.4 (22 Sep 2018 02:51), Max: 40 (21 Sep 2018 12:30)  T(F): 101.2 (22 Sep 2018 02:51), Max: 104 (21 Sep 2018 12:30)  HR: 71 (21 Sep 2018 19:32) (71 - 90)  BP: 148/53 (22 Sep 2018 02:51) (106/55 - 148/53)  BP(mean): --  RR: 18 (21 Sep 2018 19:32) (13 - 19)  SpO2: 98% (21 Sep 2018 19:32) (95% - 98%)  Daily Height in cm: 165.1 (21 Sep 2018 11:58)    Daily     PE:    Constitutional: frail looking  HEENT: NC/AT, EOMI, PERRLA, conjunctivae clear; ears and nose atraumatic; pharynx benign  Neck: supple; thyroid not palpable  Back: no tenderness  Respiratory: respiratory effort normal; crackles at right base  Cardiovascular: S1S2 regular, no murmurs  Abdomen: soft, not tender, not distended, positive BS; no liver or spleen organomegaly  Genitourinary: no suprapubic tenderness  Lymphatic: no LN palpable  Musculoskeletal: no muscle tenderness, no joint swelling or tenderness  Extremities: no pedal edema  Neurological/ Psychiatric: confused, judgement and insight impaired; moving all extremities  Skin: no rashes; no palpable lesions    Labs: all available labs reviewed                        9.5     )-----------( 175      ( 22 Sep 2018 04:47 )             29.2         141  |  108  |  11  ----------------------------<  96  3.5   |  25  |  0.40<L>    Ca    8.0<L>      22 Sep 2018 04:47    TPro  6.1  /  Alb  1.9<L>  /  TBili  0.6  /  DBili  x   /  AST  15  /  ALT  10<L>  /  AlkPhos  65       LIVER FUNCTIONS - ( 22 Sep 2018 04:47 )  Alb: 1.9 g/dL / Pro: 6.1 gm/dL / ALK PHOS: 65 U/L / ALT: 10 U/L / AST: 15 U/L / GGT: x           Urinalysis Basic - ( 21 Sep 2018 12:36 )    Color: Yellow / Appearance: Clear / S.010 / pH: x  Gluc: x / Ketone: Moderate  / Bili: Negative / Urobili: 1 mg/dL   Blood: x / Protein: 15 mg/dL / Nitrite: Negative   Leuk Esterase: Trace / RBC: 18 to 20 /HPF / WBC 3-5   Sq Epi: x / Non Sq Epi: x / Bacteria: x    Rapid Respiratory Viral Panel (18 @ 12:36)    Rapid RVP Result: Detected:     Entero/Rhinovirus (RapRVP): Detected: called to hair taylor md.18 15:41          Radiology: all available radiological tests reviewed    < from: CT Abdomen and Pelvis w/ IV Cont (18 @ 14:04) >  No abscess identified.  Right lower lobe infiltrate  Small amount of free fluid in the pelvis  Hepatic cysts  Distended gallbladder    < end of copied text >      Advanced directives addressed: DNR/DNI

## 2018-09-24 NOTE — SWALLOW BEDSIDE ASSESSMENT ADULT - SWALLOW EVAL: RECOMMENDED DIET
SUGGEST A REGULAR TEXTURE DIET WITH THIN LIQUID CONSISTENCIES AS THE PATIENT APPEARS CLINICALLY TOLERANT OF THESE FOOD TEXTURES FROM AN OROPHARYNGEAL SWALLOWING PERSPECTIVE WHEN ASSISTED TO EAT AND THIS DIET CONSISTENCY OFFERS WIDEST FOOD SELECTION. NOTE THAT PT NEEDS ASSISTANCE WITH TRAY SET UP AND FEEDING GIVEN HER MARKED CONFUSION.

## 2018-09-24 NOTE — SWALLOW BEDSIDE ASSESSMENT ADULT - SWALLOW EVAL: DIAGNOSIS
1) The patient demonstrates periodically reduced orientation to feeding with periodic psychogenically reduced willingness to accept PO atop Oropharyngeal Swallowing abilities which subjectively appear to be grossly within functional parameters for age when she was in an alert/calm state. NO behavioral aspiration signs exhibited on exam.  2) The pt was awake but distractible, fidgety, restless, oppositional and tearful at times. Unable to direct to structured communication tasks but pt did sometimes spontaneously verbalize. At these times, her verbalizations were functional from a motor speech perspective and her utterances were linguistically intact. However, her verbalizations were brief, fragmented, tangential and typically contextually inappropriate c/w baseline severe Cognitive Dysfunction associated with dementia. Communicative integrity likely at or close to usual state.

## 2018-09-24 NOTE — PROGRESS NOTE ADULT - SUBJECTIVE AND OBJECTIVE BOX
Date of service: 18 @ 16:39    Lying in bed in NAD  No SOB at rest  Has dry cough  Confused    ROS: no fever or chills; unobtainable    MEDICATIONS  (STANDING):  ALPRAZolam 0.25 milliGRAM(s) Oral three times a day  diVALproex Oral Sprinkle Capsule - Peds 125 milliGRAM(s) Oral two times a day  dorzolamide 2%/timolol 0.5% Ophthalmic Solution 1 Drop(s) Both EYES two times a day  heparin  Injectable 5000 Unit(s) SubCutaneous every 12 hours  influenza   Vaccine 0.5 milliLiter(s) IntraMuscular once  melatonin 3 milliGRAM(s) Oral at bedtime  PARoxetine 10 milliGRAM(s) Oral daily  piperacillin/tazobactam IVPB. 3.375 Gram(s) IV Intermittent every 8 hours  potassium chloride    Tablet ER 20 milliEquivalent(s) Oral once  QUEtiapine 100 milliGRAM(s) Oral at bedtime  QUEtiapine 25 milliGRAM(s) Oral daily      Vital Signs Last 24 Hrs  T(C): 36.5 (24 Sep 2018 12:07), Max: 36.8 (23 Sep 2018 20:45)  T(F): 97.7 (24 Sep 2018 12:07), Max: 98.3 (23 Sep 2018 20:45)  HR: 64 (24 Sep 2018 12:07) (64 - 73)  BP: 116/54 (24 Sep 2018 12:07) (106/53 - 130/63)  BP(mean): --  RR: 16 (24 Sep 2018 12:07) (16 - 18)  SpO2: 100% (24 Sep 2018 12:07) (99% - 100%)    Physical Exam:      Constitutional: frail looking  HEENT: NC/AT, EOMI, PERRLA, conjunctivae clear  Neck: supple; thyroid not palpable  Back: no tenderness  Respiratory: respiratory effort normal; few crackles at right base  Cardiovascular: S1S2 regular, no murmurs  Abdomen: soft, not tender, not distended, positive BS  Genitourinary: no suprapubic tenderness  Lymphatic: no LN palpable  Musculoskeletal: no muscle tenderness, no joint swelling or tenderness  Extremities: no pedal edema  Neurological/ Psychiatric: confused, moving all extremities  Skin: no rashes; no palpable lesions    Labs: reviewed    Vancomycin Level, Trough: 11.7 ug/mL ( @ 05:41)                   9.5    11.98 )-----------( 175      ( 22 Sep 2018 04:47 )             29.2         141  |  108  |  11  ----------------------------<  96  3.5   |  25  |  0.40<L>    Ca    8.0<L>      22 Sep 2018 04:47    TPro  6.1  /  Alb  1.9<L>  /  TBili  0.6  /  DBili  x   /  AST  15  /  ALT  10<L>  /  AlkPhos  65       LIVER FUNCTIONS - ( 22 Sep 2018 04:47 )  Alb: 1.9 g/dL / Pro: 6.1 gm/dL / ALK PHOS: 65 U/L / ALT: 10 U/L / AST: 15 U/L / GGT: x           Urinalysis Basic - ( 21 Sep 2018 12:36 )    Color: Yellow / Appearance: Clear / S.010 / pH: x  Gluc: x / Ketone: Moderate  / Bili: Negative / Urobili: 1 mg/dL   Blood: x / Protein: 15 mg/dL / Nitrite: Negative   Leuk Esterase: Trace / RBC: 18 to 20 /HPF / WBC 3-5   Sq Epi: x / Non Sq Epi: x / Bacteria: x    Rapid Respiratory Viral Panel (18 @ 12:36)    Rapid RVP Result: Detected:     Entero/Rhinovirus (RapRVP): Detected: called to hair taylor md.18 15:41      Culture - Urine (collected 21 Sep 2018 12:36)  Source: .Urine None  Final Report (22 Sep 2018 17:54):    No growth    Culture - Blood (collected 21 Sep 2018 12:36)  Source: .Blood None  Preliminary Report (22 Sep 2018 17:02):    No growth to date.    Culture - Blood (collected 21 Sep 2018 12:36)  Source: .Blood None  Preliminary Report (22 Sep 2018 18:02):    No growth to date.          Radiology: all available radiological tests reviewed    < from: CT Abdomen and Pelvis w/ IV Cont (18 @ 14:04) >  No abscess identified.  Right lower lobe infiltrate  Small amount of free fluid in the pelvis  Hepatic cysts  Distended gallbladder    < end of copied text >      Advanced directives addressed: DNR/DNI

## 2018-09-25 ENCOUNTER — TRANSCRIPTION ENCOUNTER (OUTPATIENT)
Age: 73
End: 2018-09-25

## 2018-09-25 VITALS
RESPIRATION RATE: 18 BRPM | TEMPERATURE: 98 F | DIASTOLIC BLOOD PRESSURE: 71 MMHG | HEART RATE: 63 BPM | SYSTOLIC BLOOD PRESSURE: 131 MMHG | OXYGEN SATURATION: 98 %

## 2018-09-25 LAB
ANION GAP SERPL CALC-SCNC: 8 MMOL/L — SIGNIFICANT CHANGE UP (ref 5–17)
BUN SERPL-MCNC: 11 MG/DL — SIGNIFICANT CHANGE UP (ref 7–23)
CALCIUM SERPL-MCNC: 8.3 MG/DL — LOW (ref 8.5–10.1)
CHLORIDE SERPL-SCNC: 107 MMOL/L — SIGNIFICANT CHANGE UP (ref 96–108)
CO2 SERPL-SCNC: 28 MMOL/L — SIGNIFICANT CHANGE UP (ref 22–31)
CREAT SERPL-MCNC: 0.69 MG/DL — SIGNIFICANT CHANGE UP (ref 0.5–1.3)
GLUCOSE SERPL-MCNC: 89 MG/DL — SIGNIFICANT CHANGE UP (ref 70–99)
HCT VFR BLD CALC: 33.9 % — LOW (ref 34.5–45)
HGB BLD-MCNC: 11.1 G/DL — LOW (ref 11.5–15.5)
MCHC RBC-ENTMCNC: 31.2 PG — SIGNIFICANT CHANGE UP (ref 27–34)
MCHC RBC-ENTMCNC: 32.7 GM/DL — SIGNIFICANT CHANGE UP (ref 32–36)
MCV RBC AUTO: 95.2 FL — SIGNIFICANT CHANGE UP (ref 80–100)
NRBC # BLD: 0 /100 WBCS — SIGNIFICANT CHANGE UP (ref 0–0)
PLATELET # BLD AUTO: 219 K/UL — SIGNIFICANT CHANGE UP (ref 150–400)
POTASSIUM SERPL-MCNC: 3.4 MMOL/L — LOW (ref 3.5–5.3)
POTASSIUM SERPL-SCNC: 3.4 MMOL/L — LOW (ref 3.5–5.3)
RBC # BLD: 3.56 M/UL — LOW (ref 3.8–5.2)
RBC # FLD: 12.2 % — SIGNIFICANT CHANGE UP (ref 10.3–14.5)
SODIUM SERPL-SCNC: 143 MMOL/L — SIGNIFICANT CHANGE UP (ref 135–145)
WBC # BLD: 8.09 K/UL — SIGNIFICANT CHANGE UP (ref 3.8–10.5)
WBC # FLD AUTO: 8.09 K/UL — SIGNIFICANT CHANGE UP (ref 3.8–10.5)

## 2018-09-25 RX ORDER — ACETAMINOPHEN 500 MG
650 TABLET ORAL EVERY 6 HOURS
Qty: 0 | Refills: 0 | Status: DISCONTINUED | OUTPATIENT
Start: 2018-09-25 | End: 2018-09-25

## 2018-09-25 RX ADMIN — DIVALPROEX SODIUM 125 MILLIGRAM(S): 500 TABLET, DELAYED RELEASE ORAL at 05:23

## 2018-09-25 RX ADMIN — QUETIAPINE FUMARATE 25 MILLIGRAM(S): 200 TABLET, FILM COATED ORAL at 12:33

## 2018-09-25 RX ADMIN — Medication 0.25 MILLIGRAM(S): at 14:14

## 2018-09-25 RX ADMIN — Medication 0.25 MILLIGRAM(S): at 05:24

## 2018-09-25 RX ADMIN — DORZOLAMIDE HYDROCHLORIDE TIMOLOL MALEATE 1 DROP(S): 20; 5 SOLUTION/ DROPS OPHTHALMIC at 05:24

## 2018-09-25 RX ADMIN — INFLUENZA VIRUS VACCINE 0.5 MILLILITER(S): 15; 15; 15; 15 SUSPENSION INTRAMUSCULAR at 15:52

## 2018-09-25 RX ADMIN — HEPARIN SODIUM 5000 UNIT(S): 5000 INJECTION INTRAVENOUS; SUBCUTANEOUS at 05:23

## 2018-09-25 RX ADMIN — PIPERACILLIN AND TAZOBACTAM 25 GRAM(S): 4; .5 INJECTION, POWDER, LYOPHILIZED, FOR SOLUTION INTRAVENOUS at 05:24

## 2018-09-25 RX ADMIN — Medication 10 MILLIGRAM(S): at 12:33

## 2018-09-25 RX ADMIN — PIPERACILLIN AND TAZOBACTAM 25 GRAM(S): 4; .5 INJECTION, POWDER, LYOPHILIZED, FOR SOLUTION INTRAVENOUS at 14:14

## 2018-09-25 NOTE — DISCHARGE NOTE ADULT - HOSPITAL COURSE
74 y/o WF with pmhx of Dementia, OCD / Anxiety, Glaucoma admitted from [facility] facility with noted temp of 102,cough and congestion. Patient is extremely poor historian due to underlying dementia. As per family, they have not noted any cough, but they indicate that several of the [facility] patients were recently sick and one of them was recently discharged from [facility].  As per family, pt is at baseline neurologically.  . Full HPI unobtainable due to patient being nonverbal and agitated     Hospital course: Pt was seen by pulmonary and infectious disease. Was treated with IV abx zosyn and improved. She will switch to PO augementin on discharge She was also seen by palliative care given worsening dementia and given overall poor prognosis she will be discharged with home hospice.

## 2018-09-25 NOTE — DISCHARGE NOTE ADULT - MEDICATION SUMMARY - MEDICATIONS TO TAKE
I will START or STAY ON the medications listed below when I get home from the hospital:    acetaminophen 325 mg oral tablet  -- 2 tab(s) by mouth 2 times a day  -- Indication: For fever    Depakote Sprinkles 125 mg oral delayed release capsule  -- 2 cap(s) by mouth 2 times a day  -- Indication: For mood    PARoxetine 10 mg oral tablet  -- 1 tab(s) by mouth once a day  -- Indication: For mood    QUEtiapine 50 mg oral tablet  -- 1 tab(s) by mouth 2 times a day  -- Indication: For mood    Xanax 0.25 mg oral tablet  -- 1 tab(s) by mouth 3 times a day  -- Indication: For mood    Colace 100 mg oral capsule  -- 1 cap(s) by mouth once a day  -- Indication: For constipation    senna 8.6 mg oral tablet  -- 2 tab(s) by mouth once a day (at bedtime)  -- Indication: For constipation    melatonin 3 mg oral tablet  -- 1 tab(s) by mouth once (at bedtime)  -- Indication: For Sleep    Cosopt PF 2%-0.5% ophthalmic solution  -- 1 drop(s) in the left eye 2 times a day  -- Indication: For Eye drops    amoxicillin-clavulanate 875 mg-125 mg oral tablet  -- 1  by mouth 2 times a day   -- Finish all this medication unless otherwise directed by prescriber.  Take with food or milk.    -- Indication: For Abx for pna    Robitussin DM Sugar Free 10 mg-100 mg/5 mL oral liquid  -- 5 milliliter(s) by mouth 2 times a day        -- Indication: For cough

## 2018-09-25 NOTE — PHYSICAL THERAPY INITIAL EVALUATION ADULT - PATIENT/FAMILY/SIGNIFICANT OTHER GOALS STATEMENT, PT EVAL
Pt to be d/c'd back to [facility] on home hospice pt's  does not wish to pursue physical therapy for his wife.

## 2018-09-25 NOTE — PROGRESS NOTE ADULT - SUBJECTIVE AND OBJECTIVE BOX
Date of service: 18 @ 13:10    Lying in bed in NAD  Denies pain  Has dry cough    ROS: no fever or chills; denies dizziness, no HA, no SOB, no abdominal pain, no diarrhea or constipation; no dysuria, no legs pain, no rashes    MEDICATIONS  (STANDING):  ALPRAZolam 0.25 milliGRAM(s) Oral three times a day  diVALproex Oral Sprinkle Capsule - Peds 125 milliGRAM(s) Oral two times a day  dorzolamide 2%/timolol 0.5% Ophthalmic Solution 1 Drop(s) Both EYES two times a day  heparin  Injectable 5000 Unit(s) SubCutaneous every 12 hours  influenza   Vaccine 0.5 milliLiter(s) IntraMuscular once  melatonin 3 milliGRAM(s) Oral at bedtime  PARoxetine 10 milliGRAM(s) Oral daily  piperacillin/tazobactam IVPB. 3.375 Gram(s) IV Intermittent every 8 hours  QUEtiapine 100 milliGRAM(s) Oral at bedtime  QUEtiapine 25 milliGRAM(s) Oral daily      Vital Signs Last 24 Hrs  T(C): 36.8 (25 Sep 2018 12:35), Max: 36.9 (25 Sep 2018 04:58)  T(F): 98.2 (25 Sep 2018 12:35), Max: 98.4 (25 Sep 2018 04:58)  HR: 63 (25 Sep 2018 12:35) (63 - 68)  BP: 131/71 (25 Sep 2018 12:35) (110/56 - 133/47)  BP(mean): --  RR: 18 (25 Sep 2018 12:35) (18 - 18)  SpO2: 98% (25 Sep 2018 12:35) (98% - 98%)    Physical Exam:      Constitutional: frail looking  HEENT: NC/AT, EOMI, PERRLA, conjunctivae clear  Neck: supple; thyroid not palpable  Back: no tenderness  Respiratory: respiratory effort normal; few crackles at right base  Cardiovascular: S1S2 regular, no murmurs  Abdomen: soft, not tender, not distended, positive BS  Genitourinary: no suprapubic tenderness  Lymphatic: no LN palpable  Musculoskeletal: no muscle tenderness, no joint swelling or tenderness  Extremities: no pedal edema  Neurological/ Psychiatric: confused, moving all extremities  Skin: no rashes; no palpable lesions    Labs: reviewed                        11.1   8.09  )-----------( 219      ( 25 Sep 2018 07:12 )             33.9         143  |  107  |  11  ----------------------------<  89  3.4<L>   |  28  |  0.69    Ca    8.3<L>      25 Sep 2018 07:12    Vancomycin Level, Trough: 11.7 ug/mL ( @ 05:41)                   9.5    11.98 )-----------( 175      ( 22 Sep 2018 04:47 )             29.2         141  |  108  |  11  ----------------------------<  96  3.5   |  25  |  0.40<L>    Ca    8.0<L>      22 Sep 2018 04:47    TPro  6.1  /  Alb  1.9<L>  /  TBili  0.6  /  DBili  x   /  AST  15  /  ALT  10<L>  /  AlkPhos  65       LIVER FUNCTIONS - ( 22 Sep 2018 04:47 )  Alb: 1.9 g/dL / Pro: 6.1 gm/dL / ALK PHOS: 65 U/L / ALT: 10 U/L / AST: 15 U/L / GGT: x           Urinalysis Basic - ( 21 Sep 2018 12:36 )    Color: Yellow / Appearance: Clear / S.010 / pH: x  Gluc: x / Ketone: Moderate  / Bili: Negative / Urobili: 1 mg/dL   Blood: x / Protein: 15 mg/dL / Nitrite: Negative   Leuk Esterase: Trace / RBC: 18 to 20 /HPF / WBC 3-5   Sq Epi: x / Non Sq Epi: x / Bacteria: x    Rapid Respiratory Viral Panel (18 @ 12:36)    Rapid RVP Result: Detected:     Entero/Rhinovirus (RapRVP): Detected: called to hair taylor md.18 15:41      Culture - Urine (collected 21 Sep 2018 12:36)  Source: .Urine None  Final Report (22 Sep 2018 17:54):    No growth    Culture - Blood (collected 21 Sep 2018 12:36)  Source: .Blood None  Preliminary Report (22 Sep 2018 17:02):    No growth to date.    Culture - Blood (collected 21 Sep 2018 12:36)  Source: .Blood None  Preliminary Report (22 Sep 2018 18:02):    No growth to date.          Radiology: all available radiological tests reviewed    < from: CT Abdomen and Pelvis w/ IV Cont (18 @ 14:04) >  No abscess identified.  Right lower lobe infiltrate  Small amount of free fluid in the pelvis  Hepatic cysts  Distended gallbladder    < end of copied text >      Advanced directives addressed: DNR/DNI

## 2018-09-25 NOTE — PROGRESS NOTE ADULT - ASSESSMENT
73y old Female coming from [facility] with hx of Dementia (dx 2016, progressive, dementia unit, 12hr aid), OCD, anxiety, glaucoma, recently admitted 8/1-8/8 for UTI, with ED visit s/p fall, now readmitted 9/21 for fever, cough and congestion. Found to have CT showing RLL pna, URI +rhinovirus, and also with agitation at times. Palliative Care consulted to assist with establishing GOC.     1) AMS  - hx of dementia likely c/b delirium  - psych notes appreciated  - c/w seroquel and xanax as noted by psych  - frequent reorientation  - fall and aspiration precautions    2) Pain  - unclear etiology  - change Tylenol to po as pt able to tolerate po meds as per RN  - consider imaging of hips    3) PNA  - RLL pna seen on imaging  - pulm notes appreciated  - seems to be improving with IV abx    4) Debility  - PPS<40%  - 12hr aid in memory care unit, needs assistance for ADLs  - evidence of inability to maintain adequate intake via muscle/fat wasting    5) Prognosis  - poor  - in light of advanced dementia, FAST 7, few words spoken, dependence for all ADLs, recurrent UTIs, albumin 1.9, muscle/fat wasting, pt fits criteria for hospice.     6) GOC/Advanced Directives  - pt does not have capacity for decision making  - HCP on chart naming children : 1) Donna Ogden; 2) Everardo Diehl  - DNR and DNI, signed  - MOLST completed: DNR, comfort measures, DNI, do not send to hospital, no feeding tube, trial of IVF ok, determine use of abx, no dialysis, no transfusions  - GOC meeting held today- family wanting to complete infectious treatment here and then are open to home hospice referral if ok with [facility] (SW checked and they are ok with this), thus referral being sent for VNS.See GOC note for further details.    Thank you for including us in Ms. Diehl's care. Will continue to follow with you.    Ledy Garcia MD  Palliative Care Attending
74 y/o female with h/o dementia, OCD, anxiety disorder, glaucoma was admitted on 9/21 from [facility] facility for fever to 102F, cough and congestion. Patient is extremely poor historian due to underlying dementia. The patient is nonverbal and restless. In ER, she received zosyn and vancomycin IV.    1. Low grade fever. Possible sepsis. RLL pneumonia. Possible aspiration pneumonia. URI with rhinovirus.  -encephalopathy  -f/u BC x 2  -on zosyn 3.375 gm IV q8h and vancomycin 750 mg IV q12h # 3  -tolerating abx well so far; no side effects noted  -vancomycin trough level is therapeutic  -respiratory care  -d/c vancomycin  -continue abx coverage with zosyn  -monitor temps  -f/u CBC  -supportive care  2. Other issues:   -care per medicine
72 y/o female with h/o dementia, OCD, anxiety disorder, glaucoma was admitted on 9/21 from [facility] facility for fever to 102F, cough and congestion. Patient is extremely poor historian due to underlying dementia. The patient is nonverbal and restless. In ER, she received zosyn and vancomycin IV.    1. Low grade fever. Possible sepsis. RLL pneumonia. Possible aspiration pneumonia. URI with rhinovirus.  -encephalopathy  -f/u BC x 2  -on zosyn 3.375 gm IV q8h and vancomycin 750 mg IV q12h # 2  -tolerating abx well so far; no side effects noted  -obtain vancomycin trough level   -respiratory care  -continue abx coverage  -monitor temps  -f/u CBC  -supportive care  2. Other issues:   -care per medicine
74 y/o female with h/o dementia, OCD, anxiety disorder, glaucoma was admitted on 9/21 from [facility] facility for fever to 102F, cough and congestion. Patient is extremely poor historian due to underlying dementia. The patient is nonverbal and restless. In ER, she received zosyn and vancomycin IV.    1. Low grade fever resolving. RLL pneumonia. Possible aspiration pneumonia. URI with rhinovirus.  -encephalopathy improving  -f/u BC x 2  -on zosyn 3.375 gm IV q8h # 4  -tolerating abx well so far; no side effects noted  -vancomycin trough level is therapeutic  -respiratory care  -continue abx coverage with zosynv for now  -monitor temps  -f/u CBC  -supportive care  2. Other issues:   -care per medicine
PROBLEMS:    Bacterial pneumonia- RLL- hcap/ ASPIRATION  Enterovirus /  Rhinovirus  Dementia  Anxiety    PLAN:    Aspiration precaution  pulmonary better  isolation  iv zosyn/vanco  supportive care  dvt prophylasix
PROBLEMS:    Bacterial pneumonia- RLL- hcap/ ASPIRATION  Enterovirus /  Rhinovirus  Dementia  Anxiety    PLAN:    Aspiration precaution  pulmonary better  iv zosyn  supportive care  dvt prophylasix
PROBLEMS:    Bacterial pneumonia- RLL- hcap/ ASPIRATION  Enterovirus /  Rhinovirus  Dementia  Anxiety    PLAN:    pulmonary better  isolation  iv zosyn  supportive care  dvt prophylasix
- on vanco/zosyn  - patient non-verbal  - daughter wants Palliative care consult  - dvt proph

## 2018-09-25 NOTE — PROGRESS NOTE ADULT - PROVIDER SPECIALTY LIST ADULT
Hospitalist
Hospitalist
Infectious Disease
Infectious Disease
Pulmonology
Palliative Care
Hospitalist
Infectious Disease
Hospitalist

## 2018-09-25 NOTE — PHYSICAL THERAPY INITIAL EVALUATION ADULT - CRITERIA FOR SKILLED THERAPEUTIC INTERVENTIONS
Pt grossly at prior level of independence across all functional mobility and does not require further skilled PT intervention while at . Pt's  does not wish to pursue PT for wife as she is being d/c'd under hospice care.

## 2018-09-25 NOTE — DISCHARGE NOTE ADULT - CARE PLAN
Principal Discharge DX:	Bacterial pneumonia  Goal:	treat infection  Assessment and plan of treatment:	complete course of abx with oral augmentin for another 6 days.  Secondary Diagnosis:	Dementia  Assessment and plan of treatment:	continue all meds, home hospice care

## 2018-09-25 NOTE — PROGRESS NOTE ADULT - SUBJECTIVE AND OBJECTIVE BOX
Subjective:    pat lying in bed, no respiratory distress.    Home Medications:  acetaminophen 325 mg oral tablet: 2 tab(s) orally 2 times a day (21 Sep 2018 18:02)  cefuroxime 250 mg oral tablet: 1 tab(s) orally every 12 hours      ***Course Completed*** (21 Sep 2018 18:02)  Colace 100 mg oral capsule: 1 cap(s) orally once a day (21 Sep 2018 18:02)  Cosopt PF 2%-0.5% ophthalmic solution: 1 drop(s) in the left eye 2 times a day (21 Sep 2018 18:02)  Depakote Sprinkles 125 mg oral delayed release capsule: 2 cap(s) orally 2 times a day (21 Sep 2018 18:02)  melatonin 3 mg oral tablet: 1 tab(s) orally once (at bedtime) (21 Sep 2018 18:02)  QUEtiapine 50 mg oral tablet: 1 tab(s) orally 2 times a day (21 Sep 2018 18:02)  Robitussin DM Sugar Free 10 mg-100 mg/5 mL oral liquid: 5 milliliter(s) orally 2 times a day       (21 Sep 2018 18:02)  senna 8.6 mg oral tablet: 2 tab(s) orally once a day (at bedtime) (21 Sep 2018 18:02)  tobramycin 0.3% ophthalmic solution: 1 drop(s) in each affected eye 3 times a day    ***Course Completed**** (21 Sep 2018 18:02)  Xanax 0.25 mg oral tablet: 1 tab(s) orally 3 times a day (21 Sep 2018 18:02)    MEDICATIONS  (STANDING):  ALPRAZolam 0.25 milliGRAM(s) Oral three times a day  diVALproex Oral Sprinkle Capsule - Peds 125 milliGRAM(s) Oral two times a day  dorzolamide 2%/timolol 0.5% Ophthalmic Solution 1 Drop(s) Both EYES two times a day  heparin  Injectable 5000 Unit(s) SubCutaneous every 12 hours  influenza   Vaccine 0.5 milliLiter(s) IntraMuscular once  melatonin 3 milliGRAM(s) Oral at bedtime  PARoxetine 10 milliGRAM(s) Oral daily  piperacillin/tazobactam IVPB. 3.375 Gram(s) IV Intermittent every 8 hours  QUEtiapine 100 milliGRAM(s) Oral at bedtime  QUEtiapine 25 milliGRAM(s) Oral daily    MEDICATIONS  (PRN):  acetaminophen  Suppository .. 650 milliGRAM(s) Rectal every 6 hours PRN Temp greater or equal to 38C (100.4F), Mild Pain (1 - 3)      Allergies    No Known Allergies    Intolerances        Vital Signs Last 24 Hrs  T(C): 36.9 (25 Sep 2018 04:58), Max: 36.9 (25 Sep 2018 04:58)  T(F): 98.4 (25 Sep 2018 04:58), Max: 98.4 (25 Sep 2018 04:58)  HR: 64 (25 Sep 2018 04:58) (64 - 68)  BP: 133/47 (25 Sep 2018 04:58) (110/56 - 133/47)  BP(mean): --  RR: 18 (25 Sep 2018 04:58) (16 - 18)  SpO2: 98% (25 Sep 2018 04:58) (98% - 100%)      PHYSICAL EXAMINATION:    NECK:  Supple. No lymphadenopathy. Jugular venous pressure not elevated. Carotids equal.   HEART:   The cardiac impulse has a normal quality. Reg., Nl S1 and S2.  There are no murmurs, rubs or gallops noted  CHEST:  Chest crackles to auscultation. Normal respiratory effort.  ABDOMEN:  Soft and nontender.   EXTREMITIES:  There is no edema.       LABS:                        11.1   8.09  )-----------( 219      ( 25 Sep 2018 07:12 )             33.9     09-25    143  |  107  |  11  ----------------------------<  89  3.4<L>   |  28  |  0.69    Ca    8.3<L>      25 Sep 2018 07:12

## 2018-09-25 NOTE — DISCHARGE NOTE ADULT - CARE PROVIDER_API CALL
Lazaro Emerson), Medicine  [address] [place] [address]  Phone: [phone]  Fax: [phone]

## 2018-09-25 NOTE — PROGRESS NOTE ADULT - SUBJECTIVE AND OBJECTIVE BOX
HPI: Pt seen and examined this am in follow up for sx,  at bedside. Pt not speaking, but appears comfortable and allowing exam, though not following commands. As per RN, has occasional pain in leg when moved, Tylenol seems effective thus far.       PAIN: as above, appears comfortable now, unable to contribute to HPI in this regard    DYSPNEA: no nonverbal signs of dyspnea      ROS:    Unable to gather 2/2 to dementia      PHYSICAL EXAM:    Vital Signs Last 24 Hrs  T(C): 36.9 (25 Sep 2018 04:58), Max: 36.9 (25 Sep 2018 04:58)  T(F): 98.4 (25 Sep 2018 04:58), Max: 98.4 (25 Sep 2018 04:58)  HR: 64 (25 Sep 2018 04:58) (64 - 68)  BP: 133/47 (25 Sep 2018 04:58) (110/56 - 133/47)  RR: 18 (25 Sep 2018 04:58) (16 - 18)  SpO2: 98% (25 Sep 2018 04:58) (98% - 100%)  Daily     Daily     PPSV2: 20-30  %  FAST: 7a    General: Older female positioned upright in bed, thin, not answering questions or following commands, awake, NAD  Mental Status: Aox0  HEENT: dmm, perrl, +temporal and clavicular wasting  Lungs: dec at bases bl  Cardiac: +s1 s2 rrr  GI: soft nt nd +bs  : incontinent  Ext: moves to touch, muscle and fat wasting throughout  Neuro: limited by mentation, moves to touch but does not follow commands       LABS:                        11.1   8.09  )-----------( 219      ( 25 Sep 2018 07:12 )             33.9     09-25    143  |  107  |  11  ----------------------------<  89  3.4<L>   |  28  |  0.69    Ca    8.3<L>      25 Sep 2018 07:12        Albumin: Albumin, Serum: 1.9 g/dL (09-22 @ 04:47)      Allergies    No Known Allergies    Intolerances      MEDICATIONS  (STANDING):  ALPRAZolam 0.25 milliGRAM(s) Oral three times a day  diVALproex Oral Sprinkle Capsule - Peds 125 milliGRAM(s) Oral two times a day  dorzolamide 2%/timolol 0.5% Ophthalmic Solution 1 Drop(s) Both EYES two times a day  heparin  Injectable 5000 Unit(s) SubCutaneous every 12 hours  influenza   Vaccine 0.5 milliLiter(s) IntraMuscular once  melatonin 3 milliGRAM(s) Oral at bedtime  PARoxetine 10 milliGRAM(s) Oral daily  piperacillin/tazobactam IVPB. 3.375 Gram(s) IV Intermittent every 8 hours  QUEtiapine 100 milliGRAM(s) Oral at bedtime  QUEtiapine 25 milliGRAM(s) Oral daily    MEDICATIONS  (PRN):  acetaminophen  Suppository .. 650 milliGRAM(s) Rectal every 6 hours PRN Temp greater or equal to 38C (100.4F), Mild Pain (1 - 3)

## 2018-09-25 NOTE — DISCHARGE NOTE ADULT - PATIENT PORTAL LINK FT
You can access the [facility], offered by [facility], by registering with the following website: [email or web address]

## 2018-09-25 NOTE — PROGRESS NOTE ADULT - SUBJECTIVE AND OBJECTIVE BOX
74 y/o WF with pmhx of Dementia, OCD / Anxiety, Glaucoma admitted from [facility] facility with noted temp of 102,cough and congestion. Patient is extremely poor historian due to underlying dementia. As per family, they have not noted any cough, but they indicate that several of the [facility] patients were recently sick and one of them was recently discharged from [facility].  As per family, pt is at baseline neurologically.  . Full HPI unobtainable due to patient being nonverbal and agitated     9/25: Pt seen and examined. Resting comfortably. Offers no complaints but with severe dementia. Spoke with patients daughter Donna and in agreement with discharge to [facility] on home hospice.         REVIEW OF SYSTEMS:  unable to attain secondary to dementia    Vital Signs Last 24 Hrs  T(C): 36.8 (25 Sep 2018 12:35), Max: 36.9 (25 Sep 2018 04:58)  T(F): 98.2 (25 Sep 2018 12:35), Max: 98.4 (25 Sep 2018 04:58)  HR: 63 (25 Sep 2018 12:35) (63 - 68)  BP: 131/71 (25 Sep 2018 12:35) (110/56 - 133/47)  BP(mean): --  RR: 18 (25 Sep 2018 12:35) (18 - 18)  SpO2: 98% (25 Sep 2018 12:35) (98% - 98%)    PHYSICAL EXAM:    GENERAL: NAD, Well nourished  HEENT:  NC/AT, EOMI, PERRLA, No scleral icterus, Moist mucous membranes  NECK: Supple, No JVD  CNS:  Alert   LUNG: Normal Breath sounds, Clear to auscultation bilaterally, No rales, No rhonchi, No wheezing  HEART: RRR; No murmurs, No rubs  ABDOMEN: +BS, ST/ND/NT  GENITOURINARY: Voiding, Bladder not distended  EXTREMITIES:  2+ Peripheral Pulses, No clubbing, No cyanosis, No tibial edema  SKIN: no rashes    Labs                              11.1   8.09  )-----------( 219      ( 25 Sep 2018 07:12 )             33.9     25 Sep 2018 07:12    143    |  107    |  11     ----------------------------<  89     3.4     |  28     |  0.69     Ca    8.3        25 Sep 2018 07:12        CAPILLARY BLOOD GLUCOSE        MEDICATIONS  (STANDING):  ALPRAZolam 0.25 milliGRAM(s) Oral three times a day  diVALproex Oral Sprinkle Capsule - Peds 125 milliGRAM(s) Oral two times a day  dorzolamide 2%/timolol 0.5% Ophthalmic Solution 1 Drop(s) Both EYES two times a day  heparin  Injectable 5000 Unit(s) SubCutaneous every 12 hours  influenza   Vaccine 0.5 milliLiter(s) IntraMuscular once  melatonin 3 milliGRAM(s) Oral at bedtime  PARoxetine 10 milliGRAM(s) Oral daily  piperacillin/tazobactam IVPB. 3.375 Gram(s) IV Intermittent every 8 hours  QUEtiapine 100 milliGRAM(s) Oral at bedtime  QUEtiapine 25 milliGRAM(s) Oral daily    MEDICATIONS  (PRN):  acetaminophen   Tablet .. 650 milliGRAM(s) Oral every 6 hours PRN Temp greater or equal to 38C (100.4F), Mild Pain (1 - 3)        all labs reviewed  all imaging reviewed    < from: CT Abdomen and Pelvis w/ IV Cont (09.21.18 @ 14:04) >  IMPRESSION:  No abscess identified.  Right lower lobe infiltrate  Small amount of free fluid in the pelvis  Hepatic cysts  Distended gallbladder      Assessment and Plan:    1. RLL Pna suspect GNR bacteria:  +Enterovirus URI  zosyn day 4, will switch to PO augmentin to complete 10 day course  Blood Cx negative  Bronchodilators prn    2. Dementia:  stable    3. r/o Dysphagia: Swallow evaluation  PT    dispo: d/c to home hospice at [facility], will complete treatment with PO augmentin. discussed plan with daughter Donna over the phone and in agreement.

## 2018-09-25 NOTE — PHYSICAL THERAPY INITIAL EVALUATION ADULT - PERTINENT HX OF CURRENT PROBLEM, REHAB EVAL
74 y/o WF with pmhx of Dementia, OCD / Anxiety, Glaucoma admitted from [facility] home facility with noted temp of 102,cough and congestion. Patient is extremely poor historian due to underlying dementia. As per family, they have not noted any cough, but they indicate that several of the [facility] patients were recently sick and one of them was recently discharged from [facility].  As per family, pt is at baseline neurologically, non-verbal.

## 2018-09-26 LAB
CULTURE RESULTS: SIGNIFICANT CHANGE UP
CULTURE RESULTS: SIGNIFICANT CHANGE UP
SPECIMEN SOURCE: SIGNIFICANT CHANGE UP
SPECIMEN SOURCE: SIGNIFICANT CHANGE UP

## 2018-09-28 DIAGNOSIS — A41.9 SEPSIS, UNSPECIFIED ORGANISM: ICD-10-CM

## 2018-09-28 DIAGNOSIS — N39.0 URINARY TRACT INFECTION, SITE NOT SPECIFIED: ICD-10-CM

## 2018-09-28 DIAGNOSIS — J06.9 ACUTE UPPER RESPIRATORY INFECTION, UNSPECIFIED: ICD-10-CM

## 2018-09-28 DIAGNOSIS — F03.90 UNSPECIFIED DEMENTIA WITHOUT BEHAVIORAL DISTURBANCE: ICD-10-CM

## 2018-09-28 DIAGNOSIS — H40.9 UNSPECIFIED GLAUCOMA: ICD-10-CM

## 2018-09-28 DIAGNOSIS — B97.10 UNSPECIFIED ENTEROVIRUS AS THE CAUSE OF DISEASES CLASSIFIED ELSEWHERE: ICD-10-CM
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
## 2018-09-28 DIAGNOSIS — Z66 DO NOT RESUSCITATE: ICD-10-CM

## 2018-09-28 DIAGNOSIS — J69.0 PNEUMONITIS DUE TO INHALATION OF FOOD AND VOMIT: ICD-10-CM

## 2018-09-28 DIAGNOSIS — G20 PARKINSON'S DISEASE: ICD-10-CM

## 2018-09-28 DIAGNOSIS — G93.40 ENCEPHALOPATHY, UNSPECIFIED: ICD-10-CM

## 2018-09-28 DIAGNOSIS — F42.9 OBSESSIVE-COMPULSIVE DISORDER, UNSPECIFIED: ICD-10-CM

## 2018-09-28 DIAGNOSIS — F41.9 ANXIETY DISORDER, UNSPECIFIED: ICD-10-CM

## 2022-04-18 NOTE — H&P ADULT - CARDIOVASCULAR DETAILS
Spoke with Dr. John regarding the old order for Escitalopram 10 mg duplicate with new order for 15 mg. The old order was DC'd.
positive S2/positive S1

## 2022-08-02 NOTE — ED PROVIDER NOTE - NORMAL, MLM
krissy all pertinent systems normal You can access the FollowMyHealth Patient Portal offered by [facility] by registering at the following website: [email or web address]. By joining [employer]’s FollowMyHealth portal, you will also be able to view your health information using other applications (apps) compatible with our system.

## 2023-12-28 NOTE — DISCHARGE NOTE ADULT - HAS THE PATIENT USED TOBACCO IN THE PAST 30 DAYS?
Pt arrives to ED with c/o of dental pain and swelling of the left lower jaw that started this morning.  Pt states she has had a broken tooth for about a year.   Unable to assess due to patient's cognitive impairment

## 2024-01-20 NOTE — DISCHARGE NOTE ADULT - PLAN OF CARE
hide treat infection complete course of abx with oral augmentin for another 6 days. continue all meds, home hospice care

## 2024-09-17 NOTE — BEHAVIORAL HEALTH ASSESSMENT NOTE - NSBHCHARTREVIEWLAB_PSY_A_CORE FT
Comprehensive Metabolic Panel (09.21.18 @ 12:36)    Sodium, Serum: 141 mmol/L    Potassium, Serum: 4.0 mmol/L    Chloride, Serum: 104 mmol/L    Carbon Dioxide, Serum: 29 mmol/L    Anion Gap, Serum: 8 mmol/L    Blood Urea Nitrogen, Serum: 12 mg/dL    Creatinine, Serum: 0.53 mg/dL    Glucose, Serum: 88 mg/dL    Calcium, Total Serum: 8.4 mg/dL    Protein Total, Serum: 7.2 gm/dL    Albumin, Serum: 2.4 g/dL    Bilirubin Total, Serum: 0.6 mg/dL    Alkaline Phosphatase, Serum: 69 U/L    Aspartate Aminotransferase (AST/SGOT): 26 U/L    Alanine Aminotransferase (ALT/SGPT): 15 U/L KELLY to bill